# Patient Record
Sex: FEMALE | Race: WHITE | NOT HISPANIC OR LATINO
[De-identification: names, ages, dates, MRNs, and addresses within clinical notes are randomized per-mention and may not be internally consistent; named-entity substitution may affect disease eponyms.]

---

## 2017-01-23 ENCOUNTER — APPOINTMENT (OUTPATIENT)
Dept: SURGERY | Facility: CLINIC | Age: 52
End: 2017-01-23

## 2017-01-23 VITALS
WEIGHT: 241 LBS | DIASTOLIC BLOOD PRESSURE: 68 MMHG | SYSTOLIC BLOOD PRESSURE: 112 MMHG | HEART RATE: 62 BPM | BODY MASS INDEX: 40.15 KG/M2 | HEIGHT: 65 IN

## 2017-01-31 ENCOUNTER — APPOINTMENT (OUTPATIENT)
Dept: RADIOLOGY | Facility: HOSPITAL | Age: 52
End: 2017-01-31

## 2017-01-31 ENCOUNTER — OUTPATIENT (OUTPATIENT)
Dept: OUTPATIENT SERVICES | Facility: HOSPITAL | Age: 52
LOS: 1 days | End: 2017-01-31
Payer: COMMERCIAL

## 2017-01-31 DIAGNOSIS — K21.9 GASTRO-ESOPHAGEAL REFLUX DISEASE WITHOUT ESOPHAGITIS: ICD-10-CM

## 2017-01-31 PROCEDURE — 77002 NEEDLE LOCALIZATION BY XRAY: CPT

## 2017-01-31 PROCEDURE — 43999 UNLISTED PROCEDURE STOMACH: CPT

## 2017-03-09 ENCOUNTER — APPOINTMENT (OUTPATIENT)
Dept: SURGERY | Facility: CLINIC | Age: 52
End: 2017-03-09

## 2017-05-10 ENCOUNTER — APPOINTMENT (OUTPATIENT)
Dept: RADIOLOGY | Facility: HOSPITAL | Age: 52
End: 2017-05-10

## 2017-05-10 ENCOUNTER — OUTPATIENT (OUTPATIENT)
Dept: OUTPATIENT SERVICES | Facility: HOSPITAL | Age: 52
LOS: 1 days | End: 2017-05-10
Payer: COMMERCIAL

## 2017-05-10 DIAGNOSIS — Z46.51 ENCOUNTER FOR FITTING AND ADJUSTMENT OF GASTRIC LAP BAND: ICD-10-CM

## 2017-05-10 PROCEDURE — 43999 UNLISTED PROCEDURE STOMACH: CPT

## 2017-05-10 PROCEDURE — 77002 NEEDLE LOCALIZATION BY XRAY: CPT

## 2017-10-19 ENCOUNTER — TRANSCRIPTION ENCOUNTER (OUTPATIENT)
Age: 52
End: 2017-10-19

## 2017-10-23 ENCOUNTER — APPOINTMENT (OUTPATIENT)
Dept: HEART AND VASCULAR | Facility: CLINIC | Age: 52
End: 2017-10-23
Payer: COMMERCIAL

## 2017-10-23 ENCOUNTER — OUTPATIENT (OUTPATIENT)
Dept: OUTPATIENT SERVICES | Facility: HOSPITAL | Age: 52
LOS: 1 days | End: 2017-10-23
Payer: COMMERCIAL

## 2017-10-23 VITALS
DIASTOLIC BLOOD PRESSURE: 68 MMHG | BODY MASS INDEX: 43.32 KG/M2 | WEIGHT: 260 LBS | HEART RATE: 60 BPM | SYSTOLIC BLOOD PRESSURE: 109 MMHG | HEIGHT: 65 IN

## 2017-10-23 PROCEDURE — 75574 CT ANGIO HRT W/3D IMAGE: CPT | Mod: 26

## 2017-10-23 PROCEDURE — 93282 PRGRMG EVAL IMPLANTABLE DFB: CPT

## 2017-10-23 PROCEDURE — 75574 CT ANGIO HRT W/3D IMAGE: CPT

## 2017-10-23 RX ORDER — PRAVASTATIN SODIUM 40 MG/1
TABLET ORAL
Refills: 0 | Status: ACTIVE | COMMUNITY

## 2017-10-23 RX ORDER — NEBIVOLOL HYDROCHLORIDE 5 MG/1
5 TABLET ORAL
Refills: 0 | Status: ACTIVE | COMMUNITY

## 2017-10-23 RX ORDER — SITAGLIPTIN 100 MG/1
100 TABLET, FILM COATED ORAL
Refills: 0 | Status: ACTIVE | COMMUNITY

## 2018-02-26 ENCOUNTER — APPOINTMENT (OUTPATIENT)
Dept: HEART AND VASCULAR | Facility: CLINIC | Age: 53
End: 2018-02-26

## 2020-06-01 ENCOUNTER — EMERGENCY (EMERGENCY)
Facility: HOSPITAL | Age: 55
LOS: 1 days | Discharge: HOME | End: 2020-06-01
Attending: EMERGENCY MEDICINE
Payer: COMMERCIAL

## 2020-06-01 VITALS
HEART RATE: 69 BPM | DIASTOLIC BLOOD PRESSURE: 73 MMHG | HEIGHT: 65 IN | SYSTOLIC BLOOD PRESSURE: 113 MMHG | OXYGEN SATURATION: 100 % | TEMPERATURE: 98 F | RESPIRATION RATE: 18 BRPM | WEIGHT: 229.94 LBS

## 2020-06-01 VITALS
SYSTOLIC BLOOD PRESSURE: 121 MMHG | OXYGEN SATURATION: 94 % | DIASTOLIC BLOOD PRESSURE: 70 MMHG | HEART RATE: 66 BPM | RESPIRATION RATE: 18 BRPM | TEMPERATURE: 98 F

## 2020-06-01 LAB
ALBUMIN SERPL ELPH-MCNC: 3.5 G/DL — SIGNIFICANT CHANGE UP (ref 3.5–5)
ALP SERPL-CCNC: 108 U/L — SIGNIFICANT CHANGE UP (ref 40–120)
ALT FLD-CCNC: 47 U/L DA — SIGNIFICANT CHANGE UP (ref 10–60)
ANION GAP SERPL CALC-SCNC: 9 MMOL/L — SIGNIFICANT CHANGE UP (ref 5–17)
APTT BLD: 37.5 SEC — HIGH (ref 27.5–36.3)
AST SERPL-CCNC: 27 U/L — SIGNIFICANT CHANGE UP (ref 10–40)
BASOPHILS # BLD AUTO: 0.07 K/UL — SIGNIFICANT CHANGE UP (ref 0–0.2)
BASOPHILS NFR BLD AUTO: 0.8 % — SIGNIFICANT CHANGE UP (ref 0–2)
BILIRUB SERPL-MCNC: 0.2 MG/DL — SIGNIFICANT CHANGE UP (ref 0.2–1.2)
BUN SERPL-MCNC: 16 MG/DL — SIGNIFICANT CHANGE UP (ref 7–18)
CALCIUM SERPL-MCNC: 9.3 MG/DL — SIGNIFICANT CHANGE UP (ref 8.4–10.5)
CHLORIDE SERPL-SCNC: 107 MMOL/L — SIGNIFICANT CHANGE UP (ref 96–108)
CK SERPL-CCNC: 109 U/L — SIGNIFICANT CHANGE UP (ref 21–215)
CK SERPL-CCNC: 119 U/L — SIGNIFICANT CHANGE UP (ref 21–215)
CO2 SERPL-SCNC: 25 MMOL/L — SIGNIFICANT CHANGE UP (ref 22–31)
CREAT SERPL-MCNC: 0.91 MG/DL — SIGNIFICANT CHANGE UP (ref 0.5–1.3)
EOSINOPHIL # BLD AUTO: 0.68 K/UL — HIGH (ref 0–0.5)
EOSINOPHIL NFR BLD AUTO: 7.9 % — HIGH (ref 0–6)
GLUCOSE SERPL-MCNC: 108 MG/DL — HIGH (ref 70–99)
HCT VFR BLD CALC: 48.3 % — HIGH (ref 34.5–45)
HGB BLD-MCNC: 14.9 G/DL — SIGNIFICANT CHANGE UP (ref 11.5–15.5)
IMM GRANULOCYTES NFR BLD AUTO: 0.8 % — SIGNIFICANT CHANGE UP (ref 0–1.5)
INR BLD: 0.91 RATIO — SIGNIFICANT CHANGE UP (ref 0.88–1.16)
LIDOCAIN IGE QN: 224 U/L — SIGNIFICANT CHANGE UP (ref 73–393)
LYMPHOCYTES # BLD AUTO: 2.14 K/UL — SIGNIFICANT CHANGE UP (ref 1–3.3)
LYMPHOCYTES # BLD AUTO: 24.7 % — SIGNIFICANT CHANGE UP (ref 13–44)
MAGNESIUM SERPL-MCNC: 1.9 MG/DL — SIGNIFICANT CHANGE UP (ref 1.6–2.6)
MCHC RBC-ENTMCNC: 26.9 PG — LOW (ref 27–34)
MCHC RBC-ENTMCNC: 30.8 GM/DL — LOW (ref 32–36)
MCV RBC AUTO: 87.3 FL — SIGNIFICANT CHANGE UP (ref 80–100)
MONOCYTES # BLD AUTO: 0.59 K/UL — SIGNIFICANT CHANGE UP (ref 0–0.9)
MONOCYTES NFR BLD AUTO: 6.8 % — SIGNIFICANT CHANGE UP (ref 2–14)
NEUTROPHILS # BLD AUTO: 5.11 K/UL — SIGNIFICANT CHANGE UP (ref 1.8–7.4)
NEUTROPHILS NFR BLD AUTO: 59 % — SIGNIFICANT CHANGE UP (ref 43–77)
NRBC # BLD: 0 /100 WBCS — SIGNIFICANT CHANGE UP (ref 0–0)
PLATELET # BLD AUTO: 285 K/UL — SIGNIFICANT CHANGE UP (ref 150–400)
POTASSIUM SERPL-MCNC: 4 MMOL/L — SIGNIFICANT CHANGE UP (ref 3.5–5.3)
POTASSIUM SERPL-SCNC: 4 MMOL/L — SIGNIFICANT CHANGE UP (ref 3.5–5.3)
PROT SERPL-MCNC: 7.6 G/DL — SIGNIFICANT CHANGE UP (ref 6–8.3)
PROTHROM AB SERPL-ACNC: 10.3 SEC — SIGNIFICANT CHANGE UP (ref 10–12.9)
RBC # BLD: 5.53 M/UL — HIGH (ref 3.8–5.2)
RBC # FLD: 14.2 % — SIGNIFICANT CHANGE UP (ref 10.3–14.5)
SODIUM SERPL-SCNC: 141 MMOL/L — SIGNIFICANT CHANGE UP (ref 135–145)
TROPONIN I SERPL-MCNC: <0.015 NG/ML — SIGNIFICANT CHANGE UP (ref 0–0.04)
TROPONIN I SERPL-MCNC: <0.015 NG/ML — SIGNIFICANT CHANGE UP (ref 0–0.04)
WBC # BLD: 8.66 K/UL — SIGNIFICANT CHANGE UP (ref 3.8–10.5)
WBC # FLD AUTO: 8.66 K/UL — SIGNIFICANT CHANGE UP (ref 3.8–10.5)

## 2020-06-01 PROCEDURE — 85027 COMPLETE CBC AUTOMATED: CPT

## 2020-06-01 PROCEDURE — 36415 COLL VENOUS BLD VENIPUNCTURE: CPT

## 2020-06-01 PROCEDURE — 85610 PROTHROMBIN TIME: CPT

## 2020-06-01 PROCEDURE — 82550 ASSAY OF CK (CPK): CPT

## 2020-06-01 PROCEDURE — 99285 EMERGENCY DEPT VISIT HI MDM: CPT

## 2020-06-01 PROCEDURE — 85730 THROMBOPLASTIN TIME PARTIAL: CPT

## 2020-06-01 PROCEDURE — 71045 X-RAY EXAM CHEST 1 VIEW: CPT | Mod: 26

## 2020-06-01 PROCEDURE — 93005 ELECTROCARDIOGRAM TRACING: CPT

## 2020-06-01 PROCEDURE — 83735 ASSAY OF MAGNESIUM: CPT

## 2020-06-01 PROCEDURE — 80053 COMPREHEN METABOLIC PANEL: CPT

## 2020-06-01 PROCEDURE — 83690 ASSAY OF LIPASE: CPT

## 2020-06-01 PROCEDURE — 99283 EMERGENCY DEPT VISIT LOW MDM: CPT | Mod: 25

## 2020-06-01 PROCEDURE — 84484 ASSAY OF TROPONIN QUANT: CPT

## 2020-06-01 PROCEDURE — 71045 X-RAY EXAM CHEST 1 VIEW: CPT

## 2020-06-01 NOTE — ED PROVIDER NOTE - PATIENT PORTAL LINK FT
You can access the FollowMyHealth Patient Portal offered by [facility] by registering at the following website: [email or web address]. By joining [employer]’s FollowMyHealth portal, you will also be able to view your health information using other applications (apps) compatible with our system.

## 2020-06-01 NOTE — ED PROVIDER NOTE - OBJECTIVE STATEMENT
56 yo female with PMHx of sudden cardiac death, CAD with stent to LAD in 4/2010, HTN, hypothyroidism, defibrillator in place, presents with several days of intermittent substernal chest discomfort that is described as if something is stuck there. Patient also reports right sided chest pain described as pinching that  began today. Patient states she has a cardiology appointment tomorrow but became concerned about the new chest pain today so she came to the ER for evaluation. She denies any shortness of breath, fever, cough, or any covid symptoms. 54 yo female with PMHx of sudden cardiac death 20 years ago now AICD, CAD with stent to LAD in 4/2010, HTN, hypothyroidism, presents with several days of intermittent substernal chest discomfort that is described as if something is stuck there. Patient also reports right sided chest pain described as pinching that  began today. Patient states she has a cardiology appointment tomorrow but became concerned about the new chest pain today so she came to the ER for evaluation. She denies any shortness of breath, fever, cough, or any covid symptoms.

## 2020-06-01 NOTE — ED PROVIDER NOTE - PSH
C Section  1991  Dyslipidemia    History of Cholecystectomy    History of Colonoscopy  In SEPT of 2010 was WNL

## 2020-06-01 NOTE — ED ADULT TRIAGE NOTE - CHIEF COMPLAINT QUOTE
c/o on and off chest pain x 3 days reports stabbing pain to Rt chest this morning w/ some numbness to Left arm

## 2024-01-24 NOTE — ED PROVIDER NOTE - CONDITION AT DISCHARGE:
IT WAS GREAT TO SEE YOU TODAY!    PLEASE WORK ON DIET - EAT MORE LEAN PROTEINS (CHICKEN, FISH, BEANS, TURKEY), FRUITS, VEGETABLES AND DRINK MORE WATER.  EAT LESS RED MEAT, DAIRY PRODUCTS, STARCHY FOODS (POTATOES, RICE, PASTA, BREAD, TORTILLAS, CHIPS, COOKIES, CAKES), SWEETS AND DRINK LESS SODA, LESS ENERGY DRINKS, LESS JUICE AND SWEET TEA.  TRY TO EAT THREE MEALS A DAY WITH SOME SORT OF PROTEIN AND TRY TO CUT BACK ON SNACKS (UNLESS IT IS HEALTHY - VEGGIES AND HUMMUS, ONE SERVING OF NUTS, ONE SERVING OF FRUIT, ETC).  PAY ATTENTION TO SERVING SIZES ON THE PACKAGES SO YOU DO NOT EAT LARGER PORTIONS.    Please try to do some form of aerobic exercise at least 3-4 times per week for about 20-30 minutes at a time.  Aerobic exercise can include walking, hiking, jogging, swimming or using an elliptical machine.  You can also do light weights or consider doing free exercises on your smart TV.  [email or web address] has multiple free exercise videos that include yoga, kickboxing, pilates, aerobics, etc.      PLEASE TAKE ALL MEDICATION AS DISCUSSED.    ~TAKE THE PHENTERMINE IN THE MORNING WITH BREAKFAST TO HELP CURB YOUR APPETITE.  USE THIS TO HELP WORK ON A HEALTHY DIET ROUTINE AND CONTINUE YOUR EXERCISE ROUTINE.  STOP THE MEDICINE AND LET ME KNOW IF IT CAUSES PALPITATIONS, HEADACHES, DIZZINESS OR SHORTNESS OF BREATH.    I WILL SEE YOU AGAIN IN 4 WEEKS/MONTHS BUT PLEASE CALL WITH CONCERNS [phone]     Improved

## 2024-07-30 NOTE — ED ADULT TRIAGE NOTE - RESPIRATORY RATE (BREATHS/MIN)
----- Message from ANU Alston sent at 7/30/2024 12:55 PM CDT -----  Normal sodium and potassium.  Normal blood sugar.  Normal kidney function.  Normal liver function. Normal blood count with no anemia.  Sed rate was just mildly elevated at 23, but CRP was normal so doubt any active inflammatory process.  Lyme screen was negative.  The other tickborne panel is still pending, this one takes a few more days to result.  How she feeling today?   18

## 2024-12-27 ENCOUNTER — INPATIENT (INPATIENT)
Facility: HOSPITAL | Age: 59
LOS: 2 days | Discharge: HOME | DRG: 66 | End: 2024-12-30
Attending: STUDENT IN AN ORGANIZED HEALTH CARE EDUCATION/TRAINING PROGRAM | Admitting: STUDENT IN AN ORGANIZED HEALTH CARE EDUCATION/TRAINING PROGRAM
Payer: COMMERCIAL

## 2024-12-27 VITALS
OXYGEN SATURATION: 98 % | RESPIRATION RATE: 16 BRPM | HEART RATE: 58 BPM | DIASTOLIC BLOOD PRESSURE: 85 MMHG | SYSTOLIC BLOOD PRESSURE: 152 MMHG | WEIGHT: 214.29 LBS | TEMPERATURE: 98 F

## 2024-12-27 DIAGNOSIS — I63.9 CEREBRAL INFARCTION, UNSPECIFIED: ICD-10-CM

## 2024-12-27 LAB
ALBUMIN SERPL ELPH-MCNC: 3.9 G/DL — SIGNIFICANT CHANGE UP (ref 3.5–5)
ALP SERPL-CCNC: 131 U/L — HIGH (ref 40–120)
ALT FLD-CCNC: 42 U/L DA — SIGNIFICANT CHANGE UP (ref 10–60)
ANION GAP SERPL CALC-SCNC: 6 MMOL/L — SIGNIFICANT CHANGE UP (ref 5–17)
APTT BLD: 37.1 SEC — HIGH (ref 24.5–35.6)
AST SERPL-CCNC: 26 U/L — SIGNIFICANT CHANGE UP (ref 10–40)
BASOPHILS # BLD AUTO: 0.08 K/UL — SIGNIFICANT CHANGE UP (ref 0–0.2)
BASOPHILS NFR BLD AUTO: 0.8 % — SIGNIFICANT CHANGE UP (ref 0–2)
BILIRUB SERPL-MCNC: 0.3 MG/DL — SIGNIFICANT CHANGE UP (ref 0.2–1.2)
BUN SERPL-MCNC: 14 MG/DL — SIGNIFICANT CHANGE UP (ref 7–18)
CALCIUM SERPL-MCNC: 9.5 MG/DL — SIGNIFICANT CHANGE UP (ref 8.4–10.5)
CHLORIDE SERPL-SCNC: 109 MMOL/L — HIGH (ref 96–108)
CO2 SERPL-SCNC: 26 MMOL/L — SIGNIFICANT CHANGE UP (ref 22–31)
CREAT SERPL-MCNC: 0.88 MG/DL — SIGNIFICANT CHANGE UP (ref 0.5–1.3)
EGFR: 76 ML/MIN/1.73M2 — SIGNIFICANT CHANGE UP
EOSINOPHIL # BLD AUTO: 0.5 K/UL — SIGNIFICANT CHANGE UP (ref 0–0.5)
EOSINOPHIL NFR BLD AUTO: 5.2 % — SIGNIFICANT CHANGE UP (ref 0–6)
GLUCOSE SERPL-MCNC: 132 MG/DL — HIGH (ref 70–99)
HCT VFR BLD CALC: 45.7 % — HIGH (ref 34.5–45)
HGB BLD-MCNC: 14.8 G/DL — SIGNIFICANT CHANGE UP (ref 11.5–15.5)
IMM GRANULOCYTES NFR BLD AUTO: 0.8 % — SIGNIFICANT CHANGE UP (ref 0–0.9)
INR BLD: 0.96 RATIO — SIGNIFICANT CHANGE UP (ref 0.85–1.16)
LYMPHOCYTES # BLD AUTO: 2.4 K/UL — SIGNIFICANT CHANGE UP (ref 1–3.3)
LYMPHOCYTES # BLD AUTO: 24.8 % — SIGNIFICANT CHANGE UP (ref 13–44)
MCHC RBC-ENTMCNC: 29.2 PG — SIGNIFICANT CHANGE UP (ref 27–34)
MCHC RBC-ENTMCNC: 32.4 G/DL — SIGNIFICANT CHANGE UP (ref 32–36)
MCV RBC AUTO: 90.3 FL — SIGNIFICANT CHANGE UP (ref 80–100)
MONOCYTES # BLD AUTO: 0.67 K/UL — SIGNIFICANT CHANGE UP (ref 0–0.9)
MONOCYTES NFR BLD AUTO: 6.9 % — SIGNIFICANT CHANGE UP (ref 2–14)
NEUTROPHILS # BLD AUTO: 5.96 K/UL — SIGNIFICANT CHANGE UP (ref 1.8–7.4)
NEUTROPHILS NFR BLD AUTO: 61.5 % — SIGNIFICANT CHANGE UP (ref 43–77)
NRBC # BLD: 0 /100 WBCS — SIGNIFICANT CHANGE UP (ref 0–0)
PLATELET # BLD AUTO: 298 K/UL — SIGNIFICANT CHANGE UP (ref 150–400)
POTASSIUM SERPL-MCNC: 3.8 MMOL/L — SIGNIFICANT CHANGE UP (ref 3.5–5.3)
POTASSIUM SERPL-SCNC: 3.8 MMOL/L — SIGNIFICANT CHANGE UP (ref 3.5–5.3)
PROT SERPL-MCNC: 7.7 G/DL — SIGNIFICANT CHANGE UP (ref 6–8.3)
PROTHROM AB SERPL-ACNC: 11.1 SEC — SIGNIFICANT CHANGE UP (ref 9.9–13.4)
RBC # BLD: 5.06 M/UL — SIGNIFICANT CHANGE UP (ref 3.8–5.2)
RBC # FLD: 13.4 % — SIGNIFICANT CHANGE UP (ref 10.3–14.5)
SODIUM SERPL-SCNC: 141 MMOL/L — SIGNIFICANT CHANGE UP (ref 135–145)
TROPONIN I, HIGH SENSITIVITY RESULT: 5.5 NG/L — SIGNIFICANT CHANGE UP
WBC # BLD: 9.69 K/UL — SIGNIFICANT CHANGE UP (ref 3.8–10.5)
WBC # FLD AUTO: 9.69 K/UL — SIGNIFICANT CHANGE UP (ref 3.8–10.5)

## 2024-12-27 PROCEDURE — 0042T: CPT | Mod: MC

## 2024-12-27 PROCEDURE — 70498 CT ANGIOGRAPHY NECK: CPT | Mod: 26,MC

## 2024-12-27 PROCEDURE — 99285 EMERGENCY DEPT VISIT HI MDM: CPT

## 2024-12-27 PROCEDURE — 99222 1ST HOSP IP/OBS MODERATE 55: CPT

## 2024-12-27 PROCEDURE — 70496 CT ANGIOGRAPHY HEAD: CPT | Mod: 26,MC

## 2024-12-27 PROCEDURE — 70450 CT HEAD/BRAIN W/O DYE: CPT | Mod: 26,MC,59

## 2024-12-27 RX ORDER — DONEPEZIL HYDROCHLORIDE 5 MG/1
10 TABLET, FILM COATED ORAL AT BEDTIME
Refills: 0 | Status: DISCONTINUED | OUTPATIENT
Start: 2024-12-28 | End: 2024-12-28

## 2024-12-27 RX ORDER — MEMANTINE HYDROCHLORIDE 14 MG/1
1 CAPSULE, EXTENDED RELEASE ORAL
Refills: 0 | DISCHARGE

## 2024-12-27 RX ORDER — TENECTEPLASE 50 MG
24 KIT INTRAVENOUS ONCE
Refills: 0 | Status: COMPLETED | OUTPATIENT
Start: 2024-12-27 | End: 2024-12-27

## 2024-12-27 RX ORDER — ORAL SEMAGLUTIDE 3 MG/1
0 TABLET ORAL
Refills: 0 | DISCHARGE

## 2024-12-27 RX ORDER — ORAL SEMAGLUTIDE 3 MG/1
1 TABLET ORAL
Refills: 0 | DISCHARGE

## 2024-12-27 RX ORDER — SODIUM CHLORIDE 9 MG/ML
10 INJECTION, SOLUTION INTRAMUSCULAR; INTRAVENOUS; SUBCUTANEOUS ONCE
Refills: 0 | Status: COMPLETED | OUTPATIENT
Start: 2024-12-27 | End: 2024-12-27

## 2024-12-27 RX ORDER — AMITRIPTYLINE HCL 25 MG
10 TABLET ORAL AT BEDTIME
Refills: 0 | Status: DISCONTINUED | OUTPATIENT
Start: 2024-12-28 | End: 2024-12-28

## 2024-12-27 RX ORDER — INSULIN LISPRO 100/ML
VIAL (ML) SUBCUTANEOUS EVERY 6 HOURS
Refills: 0 | Status: DISCONTINUED | OUTPATIENT
Start: 2024-12-27 | End: 2024-12-28

## 2024-12-27 RX ORDER — LEVOTHYROXINE SODIUM 175 UG/1
125 TABLET ORAL DAILY
Refills: 0 | Status: DISCONTINUED | OUTPATIENT
Start: 2024-12-28 | End: 2024-12-28

## 2024-12-27 RX ORDER — ICOSAPENT ETHYL 1 G/1
2 CAPSULE ORAL
Refills: 0 | DISCHARGE

## 2024-12-27 RX ORDER — ATORVASTATIN CALCIUM 40 MG/1
80 TABLET, FILM COATED ORAL AT BEDTIME
Refills: 0 | Status: DISCONTINUED | OUTPATIENT
Start: 2024-12-28 | End: 2024-12-28

## 2024-12-27 RX ORDER — AMITRIPTYLINE HCL 25 MG
1 TABLET ORAL
Refills: 0 | DISCHARGE

## 2024-12-27 RX ORDER — LEVOTHYROXINE SODIUM 175 UG/1
1 TABLET ORAL
Refills: 0 | DISCHARGE

## 2024-12-27 RX ORDER — INFLUENZA A VIRUS A/WISCONSIN/588/2019 (H1N1) RECOMBINANT HEMAGGLUTININ ANTIGEN, INFLUENZA A VIRUS A/DARWIN/6/2021 (H3N2) RECOMBINANT HEMAGGLUTININ ANTIGEN, INFLUENZA B VIRUS B/AUSTRIA/1359417/2021 RECOMBINANT HEMAGGLUTININ ANTIGEN, AND INFLUENZA B VIRUS B/PHUKET/3073/2013 RECOMBINANT HEMAGGLUTININ ANTIGEN 45; 45; 45; 45 UG/.5ML; UG/.5ML; UG/.5ML; UG/.5ML
0.5 INJECTION INTRAMUSCULAR ONCE
Refills: 0 | Status: DISCONTINUED | OUTPATIENT
Start: 2024-12-27 | End: 2024-12-30

## 2024-12-27 RX ORDER — MEMANTINE HYDROCHLORIDE 14 MG/1
5 CAPSULE, EXTENDED RELEASE ORAL
Refills: 0 | Status: DISCONTINUED | OUTPATIENT
Start: 2024-12-28 | End: 2024-12-28

## 2024-12-27 RX ORDER — ATORVASTATIN CALCIUM 40 MG/1
1 TABLET, FILM COATED ORAL
Refills: 0 | DISCHARGE

## 2024-12-27 RX ORDER — DONEPEZIL HYDROCHLORIDE 5 MG/1
0 TABLET, FILM COATED ORAL
Refills: 0 | DISCHARGE

## 2024-12-27 RX ORDER — DONEPEZIL HYDROCHLORIDE 5 MG/1
1 TABLET, FILM COATED ORAL
Refills: 0 | DISCHARGE

## 2024-12-27 RX ORDER — NEBIVOLOL 5 MG/1
1 TABLET ORAL
Refills: 0 | DISCHARGE

## 2024-12-27 RX ORDER — CHLORHEXIDINE GLUCONATE 1.2 MG/ML
1 RINSE ORAL
Refills: 0 | Status: DISCONTINUED | OUTPATIENT
Start: 2024-12-27 | End: 2024-12-30

## 2024-12-27 RX ADMIN — SODIUM CHLORIDE 10 MILLILITER(S): 9 INJECTION, SOLUTION INTRAMUSCULAR; INTRAVENOUS; SUBCUTANEOUS at 13:29

## 2024-12-27 RX ADMIN — SODIUM CHLORIDE 10 MILLILITER(S): 9 INJECTION, SOLUTION INTRAMUSCULAR; INTRAVENOUS; SUBCUTANEOUS at 13:31

## 2024-12-27 RX ADMIN — TENECTEPLASE 3456 MILLIGRAM(S): KIT at 13:30

## 2024-12-27 RX ADMIN — CHLORHEXIDINE GLUCONATE 1 APPLICATION(S): 1.2 RINSE ORAL at 18:05

## 2024-12-27 NOTE — H&P ADULT - NSICDXPASTMEDICALHX_GEN_ALL_CORE_FT
PAST MEDICAL HISTORY:  CAD (Coronary Artery Disease) with stent to LAD in 04/2010    HTN (Hypertension)     Hypothyroidism

## 2024-12-27 NOTE — ED ADULT TRIAGE NOTE - CHIEF COMPLAINT QUOTE
late entry: 1000 -1030 patient tstarted facial twitching, slurred speech, no weaknress, numbness in triage

## 2024-12-27 NOTE — ED PROVIDER NOTE - NEUROLOGICAL, MLM
No Alert and oriented, no focal deficits, no motor or sensory deficits. see nih scale for more details.

## 2024-12-27 NOTE — H&P ADULT - NSHPPHYSICALEXAM_GEN_ALL_CORE
GENERAL: NAD, obese female, laying in bed  HEAD:  Atraumatic, Normocephalic  EYES: EOMI, PERRLA, conjunctiva and sclera clear  NECK: Supple  CHEST/LUNG: Clear to auscultation bilaterally, no RRW  HEART: Regular rate and rhythm; No murmurs, rubs, or gallops  ABDOMEN: Soft, Nontender, Nondistended; Bowel sounds present  EXTREMITIES:  2+ Peripheral Pulses, No edema  PSYCH: AAOx3  NEUROLOGY: 5/5 strength in all extremities, no loss of sensation. NIHSS 3, ataxia in left arm on F2N testing (+1), Aphasia, difficulty with word finding(+2)  SKIN: No rashes or lesions

## 2024-12-27 NOTE — PATIENT PROFILE ADULT - FALL HARM RISK - HARM RISK INTERVENTIONS
Assistance with ambulation/Assistance OOB with selected safe patient handling equipment/Communicate Risk of Fall with Harm to all staff/Discuss with provider need for PT consult/Monitor for mental status changes/Monitor gait and stability/Reinforce activity limits and safety measures with patient and family/Reorient to person, place and time as needed/Review medications for side effects contributing to fall risk/Tailored Fall Risk Interventions/Toileting schedule using arm’s reach rule for commode and bathroom/Use of alarms - bed, chair and/or voice tab/Visual Cue: Yellow wristband and red socks/Bed in lowest position, wheels locked, appropriate side rails in place/Call bell, personal items and telephone in reach/Instruct patient to call for assistance before getting out of bed or chair/Non-slip footwear when patient is out of bed/[place] to call system/Physically safe environment - no spills, clutter or unnecessary equipment/Purposeful Proactive Rounding/Room/bathroom lighting operational, light cord in reach

## 2024-12-27 NOTE — H&P ADULT - ASSESSMENT
Assessment: 58 y/o F with PMHx of HTN, HLD, DM, hypothyroidism, dementia, cardaic arrest s/p ICD, current smoker who presents with aphasia. Code stroke in ED. NIHSS 2, for aphasia. Tele stroke consulted, pt s/p TNK 1:30PM 12/27. Adm to ICU for post TNK management.      Plan:  #CVA s/p TNK  #H/o of HTN, HLD, DM, Hypothyroidism, dementia, cardiac arrest    Neuro:  #AOX3, currently at baseline (has some difficulty identifying year)    #CVA  #Aphasia  Pt presents with aphasia, NIHSS 2 in ED  CT stroke protocol negative on admission  on my assessment NIHSS 3, left hand ataxia and aphasia  - C/w atorvastatin 80qhs  - C/w Neuro checks  every 15 minutes for two hours, then every 30 minutes for six hours, then every 60 minutes until 24 hours from the start of thrombolysis  - C/w TELE monitoring  - F/u Echo w/bubble  - F/u A1c, Lipid Panel  - repeat CTH 1:30PM on 12/28  Neuro Dr. Fregoso Consulted     Cardiovascular:  #HTN  - not on meds currently     #HLD  Pt has a history of HLD, takes atorvastatin 40mg qhs at home  - C/w atorvastatin    Pulmonary:   #no active issues    Infectious Diseases:  #No active issues    Gastrointestinal:  #NPO for 12 hours post TNK, can resume feeding in AM with breakfast    Renal:  #No active issues    Heme/onc:   #No active issues    Endo:   #DM  Pt has a history of DM, takes ozempic at home  A1c 6.4 from April 2024  - C/w ISS q6 while NPO  - FS q6h    Skin/ catheter:   #Peripheral Lines    Prophylaxis:   #HOLD CHEMICAL DVT PPX until after repeat CTH   - SCDs    Goals of Care: FULL CODE    Dispo: ICU Assessment: 60 y/o F with PMHx of HTN, HLD, DM, hypothyroidism, dementia, cardaic arrest s/p ICD, current smoker who presents with aphasia. Code stroke in ED. NIHSS 2, for aphasia. Tele stroke consulted, pt s/p TNK 1:30PM 12/27. Adm to ICU for post TNK management.      Plan:  #Aphasia - concern for CVA s/p TNK  #HTN  #HLD  #DM,  #Hypothyroidism  H/o dementia, cardiac arrest    Neuro:  #AOX3, currently at baseline (has some difficulty identifying year)      #Aphasia- concern for CVA  Pt presents with aphasia, NIHSS 2 in ED  CT stroke protocol negative on admission  on my assessment NIHSS 3, left hand ataxia and aphasia  - C/w atorvastatin 80qhs  - C/w Neuro checks  every 15 minutes for two hours, then every 30 minutes for six hours, then every 60 minutes until 24 hours from the start of thrombolysis  - C/w TELE monitoring  - F/u Echo w/bubble  - F/u A1c, Lipid Panel  - repeat CTH 1:30PM on 12/28  Neuro Dr. Fregoso Consulted     Cardiovascular:  #HTN  - not on meds currently     #HLD  Pt has a history of HLD, takes atorvastatin 40mg qhs at home  - C/w atorvastatin    Pulmonary:   #no active issues    Infectious Diseases:  #No active issues    Gastrointestinal:  #NPO for 12 hours post TNK, can resume feeding in AM with breakfast    Renal:  #No active issues    Heme/onc:   #No active issues    Endo:   #DM  Pt has a history of DM, takes ozempic at home  A1c 6.4 from April 2024  - C/w ISS q6 while NPO  - FS q6h    Skin/ catheter:   #Peripheral Lines    Prophylaxis:   #HOLD CHEMICAL DVT PPX until after repeat CTH   - SCDs    Goals of Care: FULL CODE    Dispo: ICU Assessment: 58 y/o F with PMHx of HTN, HLD, DM, hypothyroidism, dementia, cardiac arrest s/p ICD, current smoker who presents with aphasia. Code stroke in ED. NIHSS 2, for aphasia. Tele stroke consulted, pt s/p TNK 1:30PM 12/27. Adm to ICU for post TNK management.      Plan:  #Aphasia - concern for CVA s/p TNK  #HTN  #HLD  #DM,  #Hypothyroidism  H/o dementia, cardiac arrest    Neuro:  #AOX3, currently at baseline (has some difficulty identifying year)      #Aphasia- concern for CVA  Pt presents with aphasia, NIHSS 2 in ED  CT stroke protocol negative on admission  on my assessment NIHSS 3, left hand ataxia and aphasia  - C/w atorvastatin 80qhs  - C/w Neuro checks  every 15 minutes for two hours, then every 30 minutes for six hours, then every 60 minutes until 24 hours from the start of thrombolysis  - C/w TELE monitoring  - F/u Echo w/bubble  - F/u A1c, Lipid Panel  - repeat CTH 1:30PM on 12/28  Neuro Dr. Fregoso Consulted     Cardiovascular:  #HTN  - not on meds currently     #HLD  Pt has a history of HLD, takes atorvastatin 40mg qhs at home  - C/w atorvastatin    #h/o cardiac arrest  pt has ICD in place    Pulmonary:   #no active issues    Infectious Diseases:  #No active issues    Gastrointestinal:  #NPO for 12 hours post TNK, can resume feeding in AM with breakfast    Renal:  #No active issues    Heme/onc:   #No active issues    Endo:   #DM  Pt has a history of DM, takes ozempic at home  A1c 6.4 from April 2024  - C/w ISS q6 while NPO  - FS q6h    #Hypothyroidism  #Multinodular Goiter  Outpatient FNA done for multinodular goiter back in July 2024, was benign  Pt has a history of hypothyroidism, takes Levothyroxine 125mcg daily at home  - C/w Levothyroxine 125mcg daily  - F/u TSH    Skin/ catheter:   #Peripheral Lines    Prophylaxis:   #HOLD CHEMICAL DVT PPX until after repeat CTH   - SCDs    Goals of Care: FULL CODE    Dispo: ICU Assessment: 58 y/o F with PMHx of HTN, HLD, DM, hypothyroidism, dementia, cardiac arrest s/p ICD, current smoker who presents with aphasia. Code stroke in ED. NIHSS 2, for aphasia. Tele stroke consulted, pt s/p TNK 1:30PM 12/27. Adm to ICU for post TNK management.      Plan:  #Aphasia - concern for CVA s/p TNK  #HTN  #HLD  #DM,  #Hypothyroidism  H/o dementia, cardiac arrest    Neuro:  #AOX3, currently at baseline (has some difficulty identifying year)      #Aphasia- concern for CVA  Pt presents with aphasia, NIHSS 2 in ED  CT stroke protocol negative on admission  on my assessment NIHSS 3, left hand ataxia and aphasia  - C/w atorvastatin 80qhs  - Dysphagia screen   - C/w Neuro checks  every 15 minutes for two hours, then every 30 minutes for six hours, then every 60 minutes until 24 hours from the start of thrombolysis  - C/w TELE monitoring  - F/u Echo w/bubble  - F/u A1c, Lipid Panel  - repeat CTH 1:30PM on 12/28  Neuro Dr. Fregoso Consulted     Cardiovascular:  #HTN  - not on meds currently     #HLD  Pt has a history of HLD, takes atorvastatin 40mg qhs at home  - C/w atorvastatin    #h/o cardiac arrest  pt has ICD in place    Pulmonary:   #no active issues    Infectious Diseases:  #No active issues    Gastrointestinal:  #NPO for 12 hours post TNK, can resume feeding in AM with breakfast    Renal:  #No active issues    Heme/onc:   #No active issues    Endo:   #DM  Pt has a history of DM, takes ozempic at home  A1c 6.4 from April 2024  - C/w ISS q6 while NPO  - FS q6h    #Hypothyroidism  #Multinodular Goiter  Outpatient FNA done for multinodular goiter back in July 2024, was benign  Pt has a history of hypothyroidism, takes Levothyroxine 125mcg daily at home  - C/w Levothyroxine 125mcg daily  - F/u TSH    Skin/ catheter:   #Peripheral Lines    Prophylaxis:   #HOLD CHEMICAL DVT PPX until after repeat CTH   - SCDs    Goals of Care: FULL CODE    Dispo: ICU

## 2024-12-27 NOTE — PHARMACOTHERAPY INTERVENTION NOTE - COMMENTS
Outpatient medication review was updated based on prescription bottles brought to hospital with patient. Details for Ozempic and donepezil confirmed with pharmacy ([facility] [phone]). Previously taking empagliflozin/metformin but her physician recently stopped it as it was no longer required.
Given to patient and family: [id number] IV Thrombolytic for Adult Ischemic Acute Stroke Patients, Page 15

## 2024-12-27 NOTE — ED PROVIDER NOTE - OBJECTIVE STATEMENT
59-year-old female history of mild cognitive decline and mild aphasia, almost imperceptible as per family, presents the ER with an acute change in her ability to speak at 10 AM while she was at work as an .    It is associated with right jaw twitching.  It is unclear if the patient has history of twitching.   The son says he has never noticed twitching before and he lives with his mom.  The patient says she has had twitching of her face in the past but she cannot elaborate on her further given the degree of her expressive aphasia She is right-hand dominant.No headache or vomiting.  No slurred speech.  No arm or leg weakness.  She is not on aspirin Plavix or any blood thinners.  She is not on any antiplatelet agents.  She has no history of stroke.  Son and girlfriend at bedside says that she does have a neurologist that she saw months ago and they are in the process of working up with suspected frontotemporal dementia however this is not officially diagnosed yet.  They report that she has mild cognitive decline or delay. 59-year-old female history of mild cognitive decline and mild aphasia, almost imperceptible as per family, presents the ER with an acute change in her ability to speak at 10 AM while she was at work as an .    It is associated with right jaw twitching and headache.  It is unclear if the patient has history of twitching.   The son says he has never noticed twitching before and he lives with his mom.  The patient says she has had twitching of her face in the past but she cannot elaborate on her further given the degree of her expressive aphasia She is right-hand dominant. No headache or vomiting.  No slurred speech.  No arm or leg weakness.  She is not on aspirin Plavix or any blood thinners.  She is not on any antiplatelet agents.  She has no history of stroke.  Son and girlfriend at bedside says that she does have a neurologist that she saw months ago and they are in the process of working up with suspected frontotemporal dementia however this is not officially diagnosed yet.  They report that she has mild cognitive decline or delay. They report her aphasia today is markedly worse then her usual.

## 2024-12-27 NOTE — ED ADULT NURSE NOTE - OBJECTIVE STATEMENT
Patient presents to ED c/o aphasia x today this morning. Last well known approx 10am as per patient and patient's son at bedside. Code stroke initiated. Cardiac monitoring placed immediately upon arrival. No limb deficits or facial droop noted. Pt denies any chest pain, sob, dizziness or fever/chills.

## 2024-12-27 NOTE — H&P ADULT - HISTORY OF PRESENT ILLNESS
This is a 60 y/o F, from home, ambulates independently, with PMHx of HTN, HLD, DM, hypothyroidism, dementia, cardaic arrest s/p ICD, current smoker who presents with aphasia. Pt states around 10AM she had a severe headahce and slurred speech that lasted around 2 hours. Pt also noticed she was having trouble finding her words which still has not resolved. Pt denies any recent travel, recent illness, CP, SOB, fever, chills, N/V/D, constipation, weakness, numbness or tingling.  This is a 60 y/o F, from home, ambulates independently, with PMHx of HTN, HLD, DM, hypothyroidism, dementia, cardiac arrest s/p ICD, current smoker who presents with aphasia. Pt states around 10AM she had a severe headahce and slurred speech that lasted around 2 hours. Pt also noticed she was having trouble finding her words which still has not resolved. Pt denies any recent travel, recent illness, CP, SOB, fever, chills, N/V/D, constipation, weakness, numbness or tingling.

## 2024-12-27 NOTE — ED ADULT TRIAGE NOTE - CCCP TRG CHIEF CMPLNT
----- Message from Cara Urrutia sent at 9/21/2020 11:34 AM CDT -----  Vm @ 11:31 pt will be flying out in the morning said she is having some BP and sinus issues. Would like a call to discuss.  # [phone]     facial twitching/weakness

## 2024-12-27 NOTE — H&P ADULT - NSICDXPASTSURGICALHX_GEN_ALL_CORE_FT
PAST SURGICAL HISTORY:  C Section 1991    Dyslipidemia     History of Cholecystectomy     History of Colonoscopy In SEPT of 2010 was WNL

## 2024-12-27 NOTE — CONSULT NOTE ADULT - SUBJECTIVE AND OBJECTIVE BOX
NEUROLOGY CONSULT NOTE    NAME:  MEME MURPHY      ASSESSMENT:  59 RHF with acute onset of expressive and receptive aphasia, concerning for acute ischemic stroke vs. sequelae of dementia, s/p IV TNK administration      RECOMMENDATIONS:    1. Stroke workup  - Repeat CT Head at least 24 hours after IV TNK administration to evaluate for intracranial hemorrhage       - This may be done earlier if the patient has a decline in neurological examination  - MRI Brain approved to evaluate for acute intracranial abnormalities (if there are no contraindications)  - Transthoracic Echocardiogram  - Telemetry monitoring while inpatient  - Hemoglobin A1c  - Fasting lipid panel    2. Secondary stroke prevention  - Q1H Neurochecks & Vital signs as per post-TNK protocol  - Patient has passed a bedside swallow evaluation  - No antiplatelets or anticoagulation within 24 hours after IV TNK administration  - Atorvastatin 80mg PO QHS (may adjust dose to achieve goal LDL < 70 mg/dL)  - Treat BP if over 180/110 within first 24 hours of last seen normal; thereafter treat if over 140/90 (goal /80)  - PT/OT  - DVT ppx: SCDs    3. Unspecified Dementia  - Patient may continue home Donepezil 10mg PO Daily and Memantine 5mg PO BID to help slow down cognitive decline          NOTE TO BE COMPLETED - PLEASE REFER TO ABOVE ONLY AND IGNORE INFORMATION BELOW    *******************************      CHIEF COMPLAINT:  Patient is a 59y old  Female who presents with a chief complaint of concern for CVA s/p TNK (27 Dec 2024 15:19)      HPI:  This is a 58 y/o F, from home, ambulates independently, with PMHx of HTN, HLD, DM, hypothyroidism, dementia, cardiac arrest s/p ICD, current smoker who presents with aphasia. Pt states around 10AM she had a severe headahce and slurred speech that lasted around 2 hours. Pt also noticed she was having trouble finding her words which still has not resolved. Pt denies any recent travel, recent illness, CP, SOB, fever, chills, N/V/D, constipation, weakness, numbness or tingling.  (27 Dec 2024 15:19)      NEURO HPI:      PAST MEDICAL & SURGICAL HISTORY:  HTN (Hypertension)  Hypothyroidism  CAD (Coronary Artery Disease) with stent to LAD in 04/2010  History of Cholecystectomy  C Section, 1991  Dyslipidemia  History of Colonoscopy in Sept. 2010 (WNL)      MEDICATIONS:  amitriptyline 10 milliGRAM(s) Oral at bedtime  atorvastatin 80 milliGRAM(s) Oral at bedtime  chlorhexidine 2% Cloths 1 Application(s) Topical <User Schedule>  donepezil 10 milliGRAM(s) Oral at bedtime  influenza   Vaccine 0.5 milliLiter(s) IntraMuscular once  insulin lispro (ADMELOG) corrective regimen sliding scale   SubCutaneous every 6 hours  levothyroxine 125 MICROGram(s) Oral daily  memantine 5 milliGRAM(s) Oral two times a day      ALLERGIES:  No Known Allergies      FAMILY HISTORY:        SOCIAL HISTORY:  Denies alcohol, tobacco, or illicit drug use      REVIEW OF SYSTEMS:  GENERAL: No fever, weight changes, fatigue  EYES: No eye pain or discharge  EAR/NOSE/MOUTH/THROAT: No sinus or throat pain; No difficulty hearing  NECK: No pain or stiffness  RESPIRATORY: No cough, wheezing, chills, or hemoptysis  CARDIOVASCULAR: No chest pain, palpitations, shortness of breath, or dyspnea on exertion  GASTROINTESTINAL: No abdominal pain, nausea, vomiting, hematemesis, diarrhea, or constipation  GENITOURINARY: No dysuria, frequency, hematuria, or incontinence  SKIN: No rashes or lesions  ENDOCRINE: No heat or cold intolerance  HEMATOLOGIC: No easy bruising or bleeding  PSYCHIATRIC: No depression, anxiety, or mood swings  MUSCULOSKELETAL: No joint pain or swelling  NEUROLOGICAL: As per HPI          OBJECTIVE:    Vital Signs Last 24 Hrs  T(C): 36.1 (27 Dec 2024 16:45), Max: 36.8 (27 Dec 2024 12:48)  T(F): 97 (27 Dec 2024 16:45), Max: 98.3 (27 Dec 2024 12:48)  HR: 59 (27 Dec 2024 23:30) (51 - 84)  BP: 149/67 (27 Dec 2024 23:15) (94/54 - 153/67)  BP(mean): 89 (27 Dec 2024 23:15) (64 - 97)  RR: 13 (27 Dec 2024 23:30) (10 - 28)  SpO2: 93% (27 Dec 2024 23:30) (87% - 100%)  Parameters below as of 27 Dec 2024 18:00  Patient On (Oxygen Delivery Method): room air      General Examination:  General: No acute distress  HEENT: Atraumatic, Normocephalic  Respiratory: CTA B/l.  No crackles, rhonchi, or wheezes.  Cardiovascular: RRR.  Normal S1 & S2.  Normal b/l radial and pedal pulses.    Neurological Examination:  General / Mental Status: AAO x 3.  No aphasia or dysarthria.  Naming and repetition intact.  Cranial Nerves: VFF x 4.  PERRL.  EOMI x 2, No nystagmus or diplopia.  B/l V1-V3 equal and intact to light touch and pinprick.  Symmetric facial movement and palate elevation.  B/l hearing equal to finger rub.  5/5 strength with b/l sternocleidomastoid and trapezius.  Midline tongue protrusion, with no atrophy or fasciculations.  Motor: Normal bulk & tone in all four extremities.  5/5 strength throughout all four extremities.  No downward drift, rigidity, spasticity, or tremors in any of the four extremities.  Sensory: Intact to light touch and pinprick in all four extremities.  Negative Romberg.  Reflex: 2+ and symmetric at b/l biceps, triceps, brachioradialis, patellae, and ankles.  Downgoing toes b/l.  Coordination: No dysmetria with b/l finger-to-nose and heel raise tests.  Symmetric rapid alternating movements b/l.  Gait: Normal, narrow-based gait.  No difficulty with tiptoe, heel, and tandem gaits.          LABORATORY VALUES:                        14.8   9.69  )-----------( 298      ( 27 Dec 2024 13:00 )             45.7       12-27    141  |  109[H]  |  14  ----------------------------<  132[H]  3.8   |  26  |  0.88    Ca    9.5      27 Dec 2024 13:00    TPro  7.7  /  Alb  3.9  /  TBili  0.3  /  DBili  x   /  AST  26  /  ALT  42  /  AlkPhos  131[H]  12-27          NEUROIMAGING:          Please contact the Neurology consult service with any neurological questions.    Brian Fregoso MD   of Neurology  [facility]

## 2024-12-27 NOTE — CONSULT NOTE ADULT - PROVIDER SPECIALTY LIST ADULT
Neurology Nausea and vomiting that does not stop/Inability to tolerate liquids or foods/Increased irritability or sluggishness

## 2024-12-27 NOTE — ED PROVIDER NOTE - PROGRESS NOTE DETAILS
TNK given at 1:08 PM   For disabling neurological symptoms, aphasia, within the window.  Decision made in consultation with stroke neurology Dr. Jones via [facility].  An extensive discussion  Around risk versus benefits was had with patient and the family at bedside regarding his medication. TNK given at 1:08 PM   for disabling neurological symptoms, aphasia, moderate to severe, within the window.  Decision made in consultation with stroke neurology Dr. Jones via [facility].  An extensive discussion  Around risk versus benefits was had with patient and the family at bedside regarding his medication.

## 2024-12-27 NOTE — STROKE CODE NOTE - ASSESSMENT/PLAN
59-year-old right-handed lady evaluated by interprofessional audio consultation at [facility] on 12/27/2024 with aphasia.  NIHSS = 2 per Dr. Mack: Moderate-severe aphasia with word finding difficulty and hesitancy, moderate-severely disfluent speech; moderate-severe anomia.  CT head (12/27/2024) to my eye was unremarkable.  CTA (12/27/2024) pending  CTP (12/27/2024) pending    Impression.  She apparently has a history of very mild cognitive impairment and has been worked up by a neurologist (Dr. Alfa La) for possible frontotemporal dementia with mild baseline word finding difficulty.    Today she was last known well at about 10 AM, when she had the sudden onset of marked word finding difficulty, and on exam appears to have a moderate motor aphasia.  Her presentation is consistent with left hemispheric dysfunction, likely acute ischemic stroke.  While it is possible that her current aphasia represents some type of exacerbation of a neurodegenerative process, the apparent sudden and marked worsening still supports the possibility of acute ischemic stroke.  If this turns out to be a stroke mimic, then the risk of ICH is extremely small so benefits of thrombolysis still outweigh risks.  Dr. Mack noted some "twitches" of her left mandible; I doubt that these represent focal seizures and in any case would localize to the right hemisphere and therefore most likely, even if this were a seizure, would not be confounding the focal neurologic deficit.  No contraindications to IV thrombolysis as discussed with Dr. Mack. Pending results of CTA, most likely not an endovascular thrombectomy candidate because deficits are too mild.  Suggest.  IV tenecteplase ASAP; follow-up CTA and if there is any large vessel occlusion recontact stroke service ASAP; further management as per [facility] team.  Discussed with Dr. Mack.

## 2024-12-27 NOTE — CONSULT NOTE ADULT - REASON FOR ADMISSION
RAFI ROBERTS GIVEN PO AT THIS TIME FOR COMPLAINT OF "SEVERE" LOWER BACK PAIN.
PATIENT REFUSED TO RATE PAIN AND STATED "I'VE BEEN TELLING YOU SINCE I GOT
HERE EARLIER THAT MY BACK HURTS AND YOU OR ANYONE ELSE HAS NOT DONE ANYTHING
ABOUT IT." REORIENTED PATIENT TO PLACE AND TIME, ALSO ADVISED PATIENT THAT HE
JUST RECENTLY ARRIVED TO FLOOR AND THAT THIS RN HAD JUST RECENTLY RECEIVED
ORDERS AND MEDICATIONS FOR HIM. PATIENT REMAINS BELLIGERENT, ARGUMENTATIVE,
AND VERBALLY AGGRESSIVE. CALL LIGHT WITHIN REACH, WILL CONTINUE TO MONITOR. Concern for CVA s/p TNK

## 2024-12-27 NOTE — ED PROVIDER NOTE - CLINICAL SUMMARY MEDICAL DECISION MAKING FREE TEXT BOX
patient comes in with severe aphasia.  She is within the window for TNK.  Her symptoms are disabling.  CT head negative for bleed.  She is on blood thinners.  Extensive discussion had with family regarding treatment.  TNK given.  Admit to ICU.      Twelve-lead normal sinus rhythm no A-fib. Patient comes in with severe aphasia.  She is within the window for TNK.  Her symptoms are disabling.  CT head negative for bleed.  She is on blood thinners.  Extensive discussion had with family regarding treatment.  TNK given.  Admit to ICU.    Twelve-lead normal sinus rhythm, no A-fib.

## 2024-12-27 NOTE — H&P ADULT - ATTENDING COMMENTS
Patient seen at bedside in the ED. Concern for difficulty finding words (Unable to identify water bottle, socks, flashlight). Concern for possible ischemic stroke, NIHSS of 2. TNK given in the ED.     Monitor in the ICU per post thrombolytic protocol  Hold Aspirin for now   Repeat CT scan in 24 hours or if acute change in mentation   Monitor for signs of bleeding  BP control to maintain SBP < 180 and DBP < 105   dysphagia screening   Start statin  Check Hba1c, Echo, Lipid panel  Neurology consult

## 2024-12-28 LAB
A1C WITH ESTIMATED AVERAGE GLUCOSE RESULT: 6 % — HIGH (ref 4–5.6)
ALBUMIN SERPL ELPH-MCNC: 2.8 G/DL — LOW (ref 3.5–5)
ALP SERPL-CCNC: 99 U/L — SIGNIFICANT CHANGE UP (ref 40–120)
ALT FLD-CCNC: 34 U/L DA — SIGNIFICANT CHANGE UP (ref 10–60)
ANION GAP SERPL CALC-SCNC: 3 MMOL/L — LOW (ref 5–17)
AST SERPL-CCNC: 26 U/L — SIGNIFICANT CHANGE UP (ref 10–40)
BASOPHILS # BLD AUTO: 0.06 K/UL — SIGNIFICANT CHANGE UP (ref 0–0.2)
BASOPHILS NFR BLD AUTO: 0.8 % — SIGNIFICANT CHANGE UP (ref 0–2)
BILIRUB SERPL-MCNC: 0.4 MG/DL — SIGNIFICANT CHANGE UP (ref 0.2–1.2)
BUN SERPL-MCNC: 9 MG/DL — SIGNIFICANT CHANGE UP (ref 7–18)
CALCIUM SERPL-MCNC: 8.5 MG/DL — SIGNIFICANT CHANGE UP (ref 8.4–10.5)
CHLORIDE SERPL-SCNC: 112 MMOL/L — HIGH (ref 96–108)
CHOLEST SERPL-MCNC: 161 MG/DL — SIGNIFICANT CHANGE UP
CO2 SERPL-SCNC: 25 MMOL/L — SIGNIFICANT CHANGE UP (ref 22–31)
CREAT SERPL-MCNC: 0.59 MG/DL — SIGNIFICANT CHANGE UP (ref 0.5–1.3)
EGFR: 104 ML/MIN/1.73M2 — SIGNIFICANT CHANGE UP
EOSINOPHIL # BLD AUTO: 0.53 K/UL — HIGH (ref 0–0.5)
EOSINOPHIL NFR BLD AUTO: 6.8 % — HIGH (ref 0–6)
ESTIMATED AVERAGE GLUCOSE: 126 MG/DL — HIGH (ref 68–114)
GLUCOSE BLDC GLUCOMTR-MCNC: 119 MG/DL — HIGH (ref 70–99)
GLUCOSE BLDC GLUCOMTR-MCNC: 120 MG/DL — HIGH (ref 70–99)
GLUCOSE BLDC GLUCOMTR-MCNC: 179 MG/DL — HIGH (ref 70–99)
GLUCOSE SERPL-MCNC: 97 MG/DL — SIGNIFICANT CHANGE UP (ref 70–99)
HCT VFR BLD CALC: 40.8 % — SIGNIFICANT CHANGE UP (ref 34.5–45)
HDLC SERPL-MCNC: 44 MG/DL — LOW
HGB BLD-MCNC: 13.2 G/DL — SIGNIFICANT CHANGE UP (ref 11.5–15.5)
IMM GRANULOCYTES NFR BLD AUTO: 0.6 % — SIGNIFICANT CHANGE UP (ref 0–0.9)
LIPID PNL WITH DIRECT LDL SERPL: 81 MG/DL — SIGNIFICANT CHANGE UP
LYMPHOCYTES # BLD AUTO: 2.51 K/UL — SIGNIFICANT CHANGE UP (ref 1–3.3)
LYMPHOCYTES # BLD AUTO: 32.2 % — SIGNIFICANT CHANGE UP (ref 13–44)
MAGNESIUM SERPL-MCNC: 1.9 MG/DL — SIGNIFICANT CHANGE UP (ref 1.6–2.6)
MCHC RBC-ENTMCNC: 28.6 PG — SIGNIFICANT CHANGE UP (ref 27–34)
MCHC RBC-ENTMCNC: 32.4 G/DL — SIGNIFICANT CHANGE UP (ref 32–36)
MCV RBC AUTO: 88.3 FL — SIGNIFICANT CHANGE UP (ref 80–100)
MONOCYTES # BLD AUTO: 0.57 K/UL — SIGNIFICANT CHANGE UP (ref 0–0.9)
MONOCYTES NFR BLD AUTO: 7.3 % — SIGNIFICANT CHANGE UP (ref 2–14)
MRSA PCR RESULT.: SIGNIFICANT CHANGE UP
NEUTROPHILS # BLD AUTO: 4.08 K/UL — SIGNIFICANT CHANGE UP (ref 1.8–7.4)
NEUTROPHILS NFR BLD AUTO: 52.3 % — SIGNIFICANT CHANGE UP (ref 43–77)
NON HDL CHOLESTEROL: 117 MG/DL — SIGNIFICANT CHANGE UP
NRBC # BLD: 0 /100 WBCS — SIGNIFICANT CHANGE UP (ref 0–0)
PHOSPHATE SERPL-MCNC: 3.1 MG/DL — SIGNIFICANT CHANGE UP (ref 2.5–4.5)
PLATELET # BLD AUTO: 258 K/UL — SIGNIFICANT CHANGE UP (ref 150–400)
POTASSIUM SERPL-MCNC: 3.4 MMOL/L — LOW (ref 3.5–5.3)
POTASSIUM SERPL-SCNC: 3.4 MMOL/L — LOW (ref 3.5–5.3)
PROT SERPL-MCNC: 6.1 G/DL — SIGNIFICANT CHANGE UP (ref 6–8.3)
RBC # BLD: 4.62 M/UL — SIGNIFICANT CHANGE UP (ref 3.8–5.2)
RBC # FLD: 13.5 % — SIGNIFICANT CHANGE UP (ref 10.3–14.5)
S AUREUS DNA NOSE QL NAA+PROBE: SIGNIFICANT CHANGE UP
SODIUM SERPL-SCNC: 140 MMOL/L — SIGNIFICANT CHANGE UP (ref 135–145)
TRIGL SERPL-MCNC: 213 MG/DL — HIGH
TSH SERPL-MCNC: 0.32 UU/ML — LOW (ref 0.34–4.82)
WBC # BLD: 7.8 K/UL — SIGNIFICANT CHANGE UP (ref 3.8–10.5)
WBC # FLD AUTO: 7.8 K/UL — SIGNIFICANT CHANGE UP (ref 3.8–10.5)

## 2024-12-28 PROCEDURE — 70450 CT HEAD/BRAIN W/O DYE: CPT | Mod: 26

## 2024-12-28 RX ORDER — POTASSIUM CHLORIDE 600 MG/1
10 TABLET, FILM COATED, EXTENDED RELEASE ORAL
Refills: 0 | Status: DISCONTINUED | OUTPATIENT
Start: 2024-12-28 | End: 2024-12-28

## 2024-12-28 RX ORDER — POTASSIUM CHLORIDE 600 MG/1
40 TABLET, FILM COATED, EXTENDED RELEASE ORAL ONCE
Refills: 0 | Status: COMPLETED | OUTPATIENT
Start: 2024-12-28 | End: 2024-12-28

## 2024-12-28 RX ORDER — CLOPIDOGREL BISULFATE 75 MG/1
75 TABLET, FILM COATED ORAL DAILY
Refills: 0 | Status: DISCONTINUED | OUTPATIENT
Start: 2024-12-28 | End: 2024-12-30

## 2024-12-28 RX ORDER — ATORVASTATIN CALCIUM 40 MG/1
80 TABLET, FILM COATED ORAL AT BEDTIME
Refills: 0 | Status: DISCONTINUED | OUTPATIENT
Start: 2024-12-28 | End: 2024-12-30

## 2024-12-28 RX ORDER — LEVOTHYROXINE SODIUM 175 UG/1
90 TABLET ORAL AT BEDTIME
Refills: 0 | Status: DISCONTINUED | OUTPATIENT
Start: 2024-12-28 | End: 2024-12-28

## 2024-12-28 RX ORDER — MEMANTINE HYDROCHLORIDE 14 MG/1
5 CAPSULE, EXTENDED RELEASE ORAL
Refills: 0 | Status: DISCONTINUED | OUTPATIENT
Start: 2024-12-28 | End: 2024-12-30

## 2024-12-28 RX ORDER — LEVOTHYROXINE SODIUM 175 UG/1
125 TABLET ORAL DAILY
Refills: 0 | Status: DISCONTINUED | OUTPATIENT
Start: 2024-12-28 | End: 2024-12-30

## 2024-12-28 RX ORDER — INSULIN LISPRO 100/ML
VIAL (ML) SUBCUTANEOUS
Refills: 0 | Status: DISCONTINUED | OUTPATIENT
Start: 2024-12-28 | End: 2024-12-30

## 2024-12-28 RX ORDER — ENOXAPARIN SODIUM 60 MG/.6ML
40 INJECTION INTRAVENOUS; SUBCUTANEOUS EVERY 24 HOURS
Refills: 0 | Status: DISCONTINUED | OUTPATIENT
Start: 2024-12-28 | End: 2024-12-30

## 2024-12-28 RX ORDER — DONEPEZIL HYDROCHLORIDE 5 MG/1
10 TABLET, FILM COATED ORAL AT BEDTIME
Refills: 0 | Status: DISCONTINUED | OUTPATIENT
Start: 2024-12-28 | End: 2024-12-30

## 2024-12-28 RX ORDER — ASPIRIN 81 MG
81 TABLET, DELAYED RELEASE (ENTERIC COATED) ORAL DAILY
Refills: 0 | Status: DISCONTINUED | OUTPATIENT
Start: 2024-12-28 | End: 2024-12-30

## 2024-12-28 RX ADMIN — Medication 81 MILLIGRAM(S): at 16:08

## 2024-12-28 RX ADMIN — CLOPIDOGREL BISULFATE 75 MILLIGRAM(S): 75 TABLET, FILM COATED ORAL at 16:08

## 2024-12-28 RX ADMIN — ATORVASTATIN CALCIUM 80 MILLIGRAM(S): 40 TABLET, FILM COATED ORAL at 21:45

## 2024-12-28 RX ADMIN — MEMANTINE HYDROCHLORIDE 5 MILLIGRAM(S): 14 CAPSULE, EXTENDED RELEASE ORAL at 19:49

## 2024-12-28 RX ADMIN — Medication 1: at 16:19

## 2024-12-28 RX ADMIN — LEVOTHYROXINE SODIUM 90 MICROGRAM(S): 175 TABLET ORAL at 05:19

## 2024-12-28 RX ADMIN — ENOXAPARIN SODIUM 40 MILLIGRAM(S): 60 INJECTION INTRAVENOUS; SUBCUTANEOUS at 16:08

## 2024-12-28 RX ADMIN — DONEPEZIL HYDROCHLORIDE 10 MILLIGRAM(S): 5 TABLET, FILM COATED ORAL at 21:45

## 2024-12-28 RX ADMIN — POTASSIUM CHLORIDE 100 MILLIEQUIVALENT(S): 600 TABLET, FILM COATED, EXTENDED RELEASE ORAL at 06:11

## 2024-12-28 NOTE — CHART NOTE - NSCHARTNOTEFT_GEN_A_CORE
59F F, from home, ambulates independently, PMH of HTN, HLD, DM, hypothyroidism, dementia, cardiac arrest s/p ICD, current smoker who presents with aphasia, slurred speech, headache. Vitals, CBC, CMP unremarkable. Code stroke in ED. NIHSS 2, for aphasia. CTH: no bleed, no large vessel occlusion, but<50% stenosis of R carotid  bulb and 50% stenosis of proximal R ICA. Tele stroke consulted, s/p TNK. Adm to ICU for post TNK monitoring. Pt passed dysphagia screen and started on PO diet, atorvastatin. CTH at 24H post TNK showed no bleed. Pt started on ASA, plavix, DVT ppx with lovenox, and home meds memantine+donepezil for dementia. PT consulted. TTE with bubble study was ordered. Neuro Dr. Fregoso following. Pt medically stable for downgrade from ICU.    Signout provided to attending  ____ and _______.    Things to follow  [ ] TTE with bubble study  [ ] Neuro recs  [ ] PT recs  [ ] A1c 59F F, from home, ambulates independently, PMH of HTN, HLD, DM, hypothyroidism, dementia, cardiac arrest s/p ICD, current smoker who presents with aphasia, slurred speech, headache. Vitals, CBC, CMP unremarkable. Code stroke in ED. NIHSS 2, for aphasia. CTH: no bleed, no large vessel occlusion, but<50% stenosis of R carotid  bulb and 50% stenosis of proximal R ICA. Tele stroke consulted, s/p TNK. Adm to ICU for post TNK monitoring. Pt passed dysphagia screen and started on PO diet, atorvastatin. CTH at 24H post TNK showed no bleed. Pt started on ASA, plavix, DVT ppx with lovenox, and home meds memantine+donepezil for dementia. PT consulted. TTE with bubble study was ordered. Neuro Dr. Fregoso following. Pt medically stable for downgrade from ICU.    Signout provided to attending Dr. Torres and resident Dr. Evans    Things to follow  [ ] TTE with bubble study  [ ] Neuro recs  [ ] PT recs  [ ] A1c

## 2024-12-28 NOTE — PROGRESS NOTE ADULT - SUBJECTIVE AND OBJECTIVE BOX
Patient is a 59y old  Female who presents with a chief complaint of Concern for CVA s/p TNK (27 Dec 2024 22:00)      OVERNIGHT EVENTS:   No overnight events   Afebrile, hemodynamically stable     SUBJECTIVE/INTERVAL HPI: Patient seen and examined at bedside. Denies headache, vision changes, chest pain, shortness of breath, abdominal pain, nausea, vomiting, diarrhea, constipation, dysuria, swelling, and rash.      PRESSORS: [ ] YES [ ] NO  WHICH:    ICU Vital Signs Last 24 Hrs  T(C): 36 (28 Dec 2024 04:45), Max: 36.8 (27 Dec 2024 12:48)  T(F): 96.8 (28 Dec 2024 04:45), Max: 98.3 (27 Dec 2024 12:48)  HR: 64 (28 Dec 2024 06:00) (51 - 84)  BP: 133/75 (28 Dec 2024 06:00) (94/54 - 153/67)  BP(mean): 93 (28 Dec 2024 06:00) (64 - 97)  ABP: --  ABP(mean): --  RR: 13 (28 Dec 2024 06:00) (10 - 28)  SpO2: 94% (28 Dec 2024 06:00) (87% - 100%)    O2 Parameters below as of 27 Dec 2024 18:00  Patient On (Oxygen Delivery Method): room air          I&O's Summary    27 Dec 2024 07:01  -  28 Dec 2024 07:00  --------------------------------------------------------  IN: 0 mL / OUT: 900 mL / NET: -900 mL          PHYSICAL EXAM:  GENERAL: No acute distress   HEAD:  Atraumatic, Normocephalic  EYES: EOMI, PERRLA, conjunctiva and sclera clear  ENMT: No tonsillar erythema, exudates, or enlargement; Moist mucous membranes  NECK: Supple, No JVD, Normal thyroid  HEART: Regular rate and rhythm; No murmurs, rubs, or gallops  RESPIRATORY: CTA B/L, No W/R/R  ABDOMEN: Soft, Nontender, Nondistended; Bowel sounds present  NEUROLOGY: A&Ox3, nonfocal, moving all extremities  EXTREMITIES:  2+ Peripheral Pulses, No clubbing, cyanosis, or edema  SKIN: warm, dry, normal color, no rash or abnormal lesions    LABS:                        13.2   7.80  )-----------( 258      ( 28 Dec 2024 03:21 )             40.8     12-28    140  |  112[H]  |  9   ----------------------------<  97  3.4[L]   |  25  |  0.59    Ca    8.5      28 Dec 2024 03:21  Phos  3.1     12-28  Mg     1.9     12-28    TPro  6.1  /  Alb  2.8[L]  /  TBili  0.4  /  DBili  x   /  AST  26  /  ALT  34  /  AlkPhos  99  12-28    PT/INR - ( 27 Dec 2024 13:00 )   PT: 11.1 sec;   INR: 0.96 ratio         PTT - ( 27 Dec 2024 13:00 )  PTT:37.1 sec  Urinalysis Basic - ( 28 Dec 2024 03:21 )    Color: x / Appearance: x / SG: x / pH: x  Gluc: 97 mg/dL / Ketone: x  / Bili: x / Urobili: x   Blood: x / Protein: x / Nitrite: x   Leuk Esterase: x / RBC: x / WBC x   Sq Epi: x / Non Sq Epi: x / Bacteria: x      CAPILLARY BLOOD GLUCOSE      POCT Blood Glucose.: 119 mg/dL (27 Dec 2024 12:49)        Consultant(s) Notes Reviewed:  [x ] YES  [ ] NO    MEDICATIONS  (STANDING):  chlorhexidine 2% Cloths 1 Application(s) Topical <User Schedule>  influenza   Vaccine 0.5 milliLiter(s) IntraMuscular once  insulin lispro (ADMELOG) corrective regimen sliding scale   SubCutaneous every 6 hours  levothyroxine Injectable 90 MICROGram(s) IV Push at bedtime  potassium chloride  10 mEq/100 mL IVPB 10 milliEquivalent(s) IV Intermittent every 1 hour    MEDICATIONS  (PRN):      Care Discussed with Consultants/Other Providers [ x] YES  [ ] NO    RADIOLOGY & ADDITIONAL TESTS: Patient is a 59y old  Female who presents with a chief complaint of Concern for CVA s/p TNK (27 Dec 2024 22:00)      OVERNIGHT EVENTS:   Passed dysphagia screen in AM. Diet and PO meds were started.    SUBJECTIVE/INTERVAL HPI: Patient seen and examined at bedside. NIH = 0 in morning exam. Yuridia pain, weakness, numbness. Reports subjective dysarthria.      PRESSORS: [ ] YES [x] NO  WHICH:    ICU Vital Signs Last 24 Hrs  T(C): 36 (28 Dec 2024 04:45), Max: 36.8 (27 Dec 2024 12:48)  T(F): 96.8 (28 Dec 2024 04:45), Max: 98.3 (27 Dec 2024 12:48)  HR: 64 (28 Dec 2024 06:00) (51 - 84)  BP: 133/75 (28 Dec 2024 06:00) (94/54 - 153/67)  BP(mean): 93 (28 Dec 2024 06:00) (64 - 97)  ABP: --  ABP(mean): --  RR: 13 (28 Dec 2024 06:00) (10 - 28)  SpO2: 94% (28 Dec 2024 06:00) (87% - 100%)    O2 Parameters below as of 27 Dec 2024 18:00  Patient On (Oxygen Delivery Method): room air          I&O's Summary    27 Dec 2024 07:01  -  28 Dec 2024 07:00  --------------------------------------------------------  IN: 0 mL / OUT: 900 mL / NET: -900 mL          PHYSICAL EXAM:  GENERAL: No acute distress   HEAD:  Atraumatic, Normocephalic  EYES: EOMI, PERRLA, conjunctiva and sclera clear  ENMT: No tonsillar erythema, exudates, or enlargement; Moist mucous membranes  NECK: Supple, No JVD, Normal thyroid  HEART: Regular rate and rhythm; No murmurs, rubs, or gallops  RESPIRATORY: CTA B/L, No W/R/R  ABDOMEN: Soft, Nontender, Nondistended; Bowel sounds present  NEUROLOGY: NIH=0.  A&Ox3, nonfocal, moving all extremities. Strength 5/5 in all extremities. Sensation intact in all extremities. Face symmetric, tongue midline. No dysmetria, dysarthria, or nystagmus.  EXTREMITIES:  2+ Peripheral Pulses, No clubbing, cyanosis, or edema  SKIN: warm, dry, normal color, no rash or abnormal lesions    LABS:                        13.2   7.80  )-----------( 258      ( 28 Dec 2024 03:21 )             40.8     12-28    140  |  112[H]  |  9   ----------------------------<  97  3.4[L]   |  25  |  0.59    Ca    8.5      28 Dec 2024 03:21  Phos  3.1     12-28  Mg     1.9     12-28    TPro  6.1  /  Alb  2.8[L]  /  TBili  0.4  /  DBili  x   /  AST  26  /  ALT  34  /  AlkPhos  99  12-28    PT/INR - ( 27 Dec 2024 13:00 )   PT: 11.1 sec;   INR: 0.96 ratio         PTT - ( 27 Dec 2024 13:00 )  PTT:37.1 sec  Urinalysis Basic - ( 28 Dec 2024 03:21 )    Color: x / Appearance: x / SG: x / pH: x  Gluc: 97 mg/dL / Ketone: x  / Bili: x / Urobili: x   Blood: x / Protein: x / Nitrite: x   Leuk Esterase: x / RBC: x / WBC x   Sq Epi: x / Non Sq Epi: x / Bacteria: x      CAPILLARY BLOOD GLUCOSE      POCT Blood Glucose.: 119 mg/dL (27 Dec 2024 12:49)        Consultant(s) Notes Reviewed:  [x ] YES  [ ] NO    MEDICATIONS  (STANDING):  chlorhexidine 2% Cloths 1 Application(s) Topical <User Schedule>  influenza   Vaccine 0.5 milliLiter(s) IntraMuscular once  insulin lispro (ADMELOG) corrective regimen sliding scale   SubCutaneous every 6 hours  levothyroxine Injectable 90 MICROGram(s) IV Push at bedtime  potassium chloride  10 mEq/100 mL IVPB 10 milliEquivalent(s) IV Intermittent every 1 hour    MEDICATIONS  (PRN):      Care Discussed with Consultants/Other Providers [ x] YES  [ ] NO

## 2024-12-28 NOTE — PROGRESS NOTE ADULT - ASSESSMENT
58 y/o F with PMHx of HTN, HLD, DM, hypothyroidism, dementia, cardiac arrest s/p ICD, current smoker who presents with aphasia. Code stroke in ED. NIHSS 2, for aphasia. Tele stroke consulted, pt s/p TNK 1:30PM 12/27. Adm to ICU for post TNK management.      ===Neuro:===  #AOX3, currently at baseline (has some difficulty identifying year)    #CVA  Pt presents with aphasia, NIHSS 2 in ED  CT stroke protocol negative on admission  CT with no large vessel stenosis or occlusion. <50% stenosis of R carotid  bulb and 50% stenosis of proximal R ICA.  - NIH = 0 in AM exam 12/28  - C/w atorvastatin 80qhs  - PO diet as passed dysphagia  - C/w Neuro checks  every 15 minutes for two hours, then every 30 minutes for six hours, then every 60 minutes until 24 hours from the start of thrombolysis  - C/w TELE monitoring  - F/u Echo w/bubble  - Atorvastatin 80 QD for dyslipidemia noted  - repeat CTH 1:30PM on 12/28; if no bleed, start ASA+plavix+DVT ppx  - Neuro Dr. Fregoso following    ===Cardiovascular:===  #HTN, chronic  - not on meds currently     #HLD  Pt has a history of HLD  - C/w atorvastatin    #h/o cardiac arrest  pt has ICD in place    ===Pulmonary:===   #no active issues    ===Infectious Diseases:===  #No active issues    ===Gastrointestinal:===  Passed dysphagia, 12H post TNK, started PO diet    ===Renal:===  #No active issues    ===Heme/onc: ===  #No active issues    ===Endo: ===  #DM  Takes ozempic at home  A1c 6.4 from April 2024  - F/u A1c  - ISS    #Hypothyroidism  #Multinodular Goiter  Outpatient FNA done for multinodular goiter back in July 2024, was benign  Pt has a history of hypothyroidism, takes Levothyroxine 125mcg daily at home  - TSH 0.32  - C/w Levothyroxine 125mcg daily    ===Skin/ catheter: ===  #Peripheral Lines    ===Prophylaxis: ===  #HOLD CHEMICAL DVT PPX until after repeat CTH   - SCDs    Goals of Care: FULL CODE    Dispo: ICU 60 y/o F with PMHx of HTN, HLD, DM, hypothyroidism, dementia, cardiac arrest s/p ICD, current smoker who presents with aphasia. Code stroke in ED. NIHSS 2, for aphasia. Tele stroke consulted, pt s/p TNK 1:30PM 12/27. Adm to ICU for post TNK management.      ===Neuro:===  #CVA  Pt presents with aphasia, NIHSS 2 in ED  CT stroke protocol negative on admission  CT with no large vessel stenosis or occlusion. <50% stenosis of R carotid  bulb and 50% stenosis of proximal R ICA.  - NIH = 0 in AM exam 12/28  - C/w atorvastatin 80qhs  - PO diet as passed dysphagia  - C/w Neuro checks  every 15 minutes for two hours, then every 30 minutes for six hours, then every 60 minutes until 24 hours from the start of thrombolysis  - C/w TELE monitoring  - F/u Echo w/bubble  - Atorvastatin 80 QD for dyslipidemia noted  - repeat CTH 1:30PM on 12/28; if no bleed, start ASA+plavix+DVT ppx  - Neuro Dr. Fregoso following    #Dementia, mild  - A&Ox3 in AM exam  - Resume home meds donepezil and memantine    ===Cardiovascular:===  #HTN, chronic  - not on home meds currently     #HLD  Pt has a history of HLD  - C/w atorvastatin    #h/o cardiac arrest  pt has ICD in place    ===Pulmonary:===   #no active issues    ===Infectious Diseases:===  #No active issues    ===Gastrointestinal:===  Passed dysphagia, 12H post TNK, started PO diet    ===Renal:===  #No active issues    ===Heme/onc: ===  #No active issues    ===Endo: ===  #DM  Takes ozempic at home  A1c 6.4 from April 2024  - F/u A1c  - ISS    #Hypothyroidism  #Multinodular Goiter  Outpatient FNA done for multinodular goiter back in July 2024, was benign  Pt has a history of hypothyroidism, takes Levothyroxine 125mcg daily at home  - TSH 0.32  - C/w Levothyroxine 125mcg daily    ===Skin/ catheter: ===  #Peripheral Lines    ===Prophylaxis: ===  #HOLD CHEMICAL DVT PPX until after repeat CTH   - SCDs    Goals of Care: FULL CODE    Dispo: ICU

## 2024-12-29 LAB
ALBUMIN SERPL ELPH-MCNC: 3.1 G/DL — LOW (ref 3.5–5)
ALP SERPL-CCNC: 102 U/L — SIGNIFICANT CHANGE UP (ref 40–120)
ALT FLD-CCNC: 34 U/L DA — SIGNIFICANT CHANGE UP (ref 10–60)
ANION GAP SERPL CALC-SCNC: 7 MMOL/L — SIGNIFICANT CHANGE UP (ref 5–17)
AST SERPL-CCNC: 23 U/L — SIGNIFICANT CHANGE UP (ref 10–40)
BILIRUB SERPL-MCNC: 0.4 MG/DL — SIGNIFICANT CHANGE UP (ref 0.2–1.2)
BUN SERPL-MCNC: 12 MG/DL — SIGNIFICANT CHANGE UP (ref 7–18)
CALCIUM SERPL-MCNC: 8.8 MG/DL — SIGNIFICANT CHANGE UP (ref 8.4–10.5)
CHLORIDE SERPL-SCNC: 111 MMOL/L — HIGH (ref 96–108)
CO2 SERPL-SCNC: 23 MMOL/L — SIGNIFICANT CHANGE UP (ref 22–31)
CREAT SERPL-MCNC: 0.78 MG/DL — SIGNIFICANT CHANGE UP (ref 0.5–1.3)
EGFR: 87 ML/MIN/1.73M2 — SIGNIFICANT CHANGE UP
GLUCOSE BLDC GLUCOMTR-MCNC: 114 MG/DL — HIGH (ref 70–99)
GLUCOSE BLDC GLUCOMTR-MCNC: 123 MG/DL — HIGH (ref 70–99)
GLUCOSE BLDC GLUCOMTR-MCNC: 127 MG/DL — HIGH (ref 70–99)
GLUCOSE BLDC GLUCOMTR-MCNC: 84 MG/DL — SIGNIFICANT CHANGE UP (ref 70–99)
GLUCOSE SERPL-MCNC: 125 MG/DL — HIGH (ref 70–99)
HCT VFR BLD CALC: 40.3 % — SIGNIFICANT CHANGE UP (ref 34.5–45)
HGB BLD-MCNC: 13.1 G/DL — SIGNIFICANT CHANGE UP (ref 11.5–15.5)
MAGNESIUM SERPL-MCNC: 2.1 MG/DL — SIGNIFICANT CHANGE UP (ref 1.6–2.6)
MCHC RBC-ENTMCNC: 28.2 PG — SIGNIFICANT CHANGE UP (ref 27–34)
MCHC RBC-ENTMCNC: 32.5 G/DL — SIGNIFICANT CHANGE UP (ref 32–36)
MCV RBC AUTO: 86.9 FL — SIGNIFICANT CHANGE UP (ref 80–100)
NRBC # BLD: 0 /100 WBCS — SIGNIFICANT CHANGE UP (ref 0–0)
PHOSPHATE SERPL-MCNC: 3.5 MG/DL — SIGNIFICANT CHANGE UP (ref 2.5–4.5)
PLATELET # BLD AUTO: 258 K/UL — SIGNIFICANT CHANGE UP (ref 150–400)
POTASSIUM SERPL-MCNC: 3.1 MMOL/L — LOW (ref 3.5–5.3)
POTASSIUM SERPL-SCNC: 3.1 MMOL/L — LOW (ref 3.5–5.3)
PROT SERPL-MCNC: 6.4 G/DL — SIGNIFICANT CHANGE UP (ref 6–8.3)
RBC # BLD: 4.64 M/UL — SIGNIFICANT CHANGE UP (ref 3.8–5.2)
RBC # FLD: 13.8 % — SIGNIFICANT CHANGE UP (ref 10.3–14.5)
SODIUM SERPL-SCNC: 141 MMOL/L — SIGNIFICANT CHANGE UP (ref 135–145)
WBC # BLD: 6.86 K/UL — SIGNIFICANT CHANGE UP (ref 3.8–10.5)
WBC # FLD AUTO: 6.86 K/UL — SIGNIFICANT CHANGE UP (ref 3.8–10.5)

## 2024-12-29 PROCEDURE — 99232 SBSQ HOSP IP/OBS MODERATE 35: CPT

## 2024-12-29 RX ORDER — POTASSIUM CHLORIDE 600 MG/1
40 TABLET, FILM COATED, EXTENDED RELEASE ORAL ONCE
Refills: 0 | Status: COMPLETED | OUTPATIENT
Start: 2024-12-29 | End: 2024-12-29

## 2024-12-29 RX ADMIN — CHLORHEXIDINE GLUCONATE 1 APPLICATION(S): 1.2 RINSE ORAL at 06:03

## 2024-12-29 RX ADMIN — POTASSIUM CHLORIDE 40 MILLIEQUIVALENT(S): 600 TABLET, FILM COATED, EXTENDED RELEASE ORAL at 12:12

## 2024-12-29 RX ADMIN — Medication 81 MILLIGRAM(S): at 12:12

## 2024-12-29 RX ADMIN — CLOPIDOGREL BISULFATE 75 MILLIGRAM(S): 75 TABLET, FILM COATED ORAL at 12:12

## 2024-12-29 RX ADMIN — DONEPEZIL HYDROCHLORIDE 10 MILLIGRAM(S): 5 TABLET, FILM COATED ORAL at 22:07

## 2024-12-29 RX ADMIN — ENOXAPARIN SODIUM 40 MILLIGRAM(S): 60 INJECTION INTRAVENOUS; SUBCUTANEOUS at 17:14

## 2024-12-29 RX ADMIN — MEMANTINE HYDROCHLORIDE 5 MILLIGRAM(S): 14 CAPSULE, EXTENDED RELEASE ORAL at 06:01

## 2024-12-29 RX ADMIN — LEVOTHYROXINE SODIUM 125 MICROGRAM(S): 175 TABLET ORAL at 06:01

## 2024-12-29 RX ADMIN — MEMANTINE HYDROCHLORIDE 5 MILLIGRAM(S): 14 CAPSULE, EXTENDED RELEASE ORAL at 17:14

## 2024-12-29 RX ADMIN — ATORVASTATIN CALCIUM 80 MILLIGRAM(S): 40 TABLET, FILM COATED ORAL at 22:07

## 2024-12-29 NOTE — PROGRESS NOTE ADULT - SUBJECTIVE AND OBJECTIVE BOX
Vital Signs Last 24 Hrs  T(C): 36.3 (29 Dec 2024 11:13), Max: 37.1 (28 Dec 2024 20:58)  T(F): 97.3 (29 Dec 2024 11:13), Max: 98.8 (28 Dec 2024 20:58)  HR: 66 (29 Dec 2024 11:13) (62 - 79)  BP: 116/63 (29 Dec 2024 11:13) (111/68 - 131/64)  BP(mean): --  RR: 18 (29 Dec 2024 11:13) (17 - 18)  SpO2: 98% (29 Dec 2024 11:13) (94% - 99%)    Parameters below as of 29 Dec 2024 11:13  Patient On (Oxygen Delivery Method): room air                          13.1   6.86  )-----------( 258      ( 29 Dec 2024 05:30 )             40.3     12-29    141  |  111[H]  |  12  ----------------------------<  125[H]  3.1[L]   |  23  |  0.78    Ca    8.8      29 Dec 2024 05:30  Phos  3.5     12-29  Mg     2.1     12-29    TPro  6.4  /  Alb  3.1[L]  /  TBili  0.4  /  DBili  x   /  AST  23  /  ALT  34  /  AlkPhos  102  12-29

## 2024-12-29 NOTE — PROGRESS NOTE ADULT - ASSESSMENT
Patient seen and examined at bedside this morning. Noted to be walking from bathroom back to chair, with minimal help from  at bedside. Per  at bedside, the patient still occasionally stuttering. She denies any other new deficits, including sensory or strength.  Vitals, labs reviewed as above. VSS. K noted 3.1, cbc wnl.  PE - NAD, NC/AT, no facial droop, RRR, lungs cta, abd soft ntnd, extremities wwp, no changes in b/l UE or LE for sensory or strength changes    A/P   60yo F with PMH of HTN, HLD, DM, hypothyroidism, dementia, cardiac arrest s/p ICD, current smoker who presents with aphasia, slurred speech, headache, NIHSS 2, admitted for CT findings 50% stenosis of proximal R ICA, tele stroke consulted, s/p TNK, admitted to ICU for monitoring, then downgraded to medicine for further management.    #Aphasia - concern for CVA s/p TNK   #HTN   #HLD   #DM   #Hypothyroidism   #H/o dementia, cardiac arrest     -c/w asa, plavix, statin   -MRI brain   -neurology following/recs from Dr Fregoso   -TTE w bubble   -PT eval   -a1c noted 6, monitor FS/ISS   -resumed on home meds memantine, donepezil   -c/w levothyroxine   -replete K  -diet as tolerated   -dvt ppx lovenox

## 2024-12-29 NOTE — PHYSICAL THERAPY INITIAL EVALUATION ADULT - MODIFIED CLINICAL TEST OF SENSORY INTEGRATION IN BALANCE TEST
Postural Assessment Stroke Scale Total Score (PASS): 35/36 Total; (Maintain Posture Subscale = 15/15)+(Change Posture Subscale = 20/21): Lower scores equates to greater impairment in postural control.

## 2024-12-29 NOTE — PHYSICAL THERAPY INITIAL EVALUATION ADULT - DIAGNOSIS, PT EVAL
Pt. present w/deficits in  Body Structures/Function including strength, balance, concern for CVA leading to deficits in performing bed mobility, transfer and ambulation endurance.

## 2024-12-29 NOTE — PHYSICAL THERAPY INITIAL EVALUATION ADULT - ADDITIONAL COMMENTS
Patient lives in a private house with stairs (x 2 flights), no assistive device needs prior to admission.

## 2024-12-29 NOTE — PHYSICAL THERAPY INITIAL EVALUATION ADULT - GENERAL OBSERVATIONS, REHAB EVAL
Patient was seen for PT evaluation today. EMR, laboratory and radiology results reviewed. Pt received supine in bed, NAD, Telemetry in situ

## 2024-12-29 NOTE — PHYSICAL THERAPY INITIAL EVALUATION ADULT - PHYSICAL ASSIST/NONPHYSICAL ASSIST: BED TO CHAIR, REHAB EVAL
Pt complains of itchiness to anterior chest. Area localized, observed to be red & raised.
verbal cues/1 person assist

## 2024-12-29 NOTE — PROGRESS NOTE ADULT - SUBJECTIVE AND OBJECTIVE BOX
NEUROLOGY FOLLOW-UP NOTE    NAME:  MEME MURPHY      ASSESSMENT:  59 RHF with acute onset of expressive and receptive aphasia, concerning for acute ischemic stroke vs. sequelae of dementia, s/p IV TNK administration      RECOMMENDATIONS:    1. Stroke workup  - Repeat CT Head 24 hours after IV TNK administration showed no acute intracranial hemorrhage  - MRI Brain approved to evaluate for acute intracranial abnormalities (if there are no contraindications)  - Transthoracic Echocardiogram  - Telemetry monitoring while inpatient    2. Secondary stroke prevention  - Q4H Neurochecks & Vital signs  - Patient has passed a bedside swallow evaluation  - Continue Aspirin 81mg PO Daily  - Continue Clopidogrel 75mg PO Daily  - Okay to decrease Atorvastatin dosage from 80mg to home dose of 40mg PO QHS (goal LDL < 70 mg/dL)  - Treat BP if over 140/90 (goal /80) - Maintain home antihypertensive medications, No role for permissive hypertension  - PT/OT  - DVT ppx: SCDs    3. Unspecified Dementia  - Continue home Donepezil 10mg PO Daily and Memantine 5mg PO BID to help slow down cognitive decline          NOTE TO BE COMPLETED - PLEASE REFER TO ABOVE ONLY AND IGNORE INFORMATION BELOW    ******************************    HPI:  This is a 60 y/o F, from home, ambulates independently, with PMHx of HTN, HLD, DM, hypothyroidism, dementia, cardiac arrest s/p ICD, current smoker who presents with aphasia. Pt states around 10AM she had a severe headahce and slurred speech that lasted around 2 hours. Pt also noticed she was having trouble finding her words which still has not resolved. Pt denies any recent travel, recent illness, CP, SOB, fever, chills, N/V/D, constipation, weakness, numbness or tingling.  (27 Dec 2024 15:19)      NEURO HPI:      INTERVAL HISTORY:      MEDICATIONS:  aspirin  chewable 81 milliGRAM(s) Oral daily  atorvastatin 80 milliGRAM(s) Oral at bedtime  chlorhexidine 2% Cloths 1 Application(s) Topical <User Schedule>  clopidogrel Tablet 75 milliGRAM(s) Oral daily  donepezil 10 milliGRAM(s) Oral at bedtime  enoxaparin Injectable 40 milliGRAM(s) SubCutaneous every 24 hours  influenza   Vaccine 0.5 milliLiter(s) IntraMuscular once  insulin lispro (ADMELOG) corrective regimen sliding scale   SubCutaneous three times a day before meals  levothyroxine 125 MICROGram(s) Oral daily  memantine 5 milliGRAM(s) Oral two times a day      ALLERGIES:  No Known Allergies      REVIEW OF SYSTEMS:  Fourteen systems reviewed and negative except as in HPI / Interval History.          OBJECTIVE:  Vital Signs Last 24 Hrs  T(C): 36.6 (29 Dec 2024 23:38), Max: 37.1 (29 Dec 2024 20:30)  T(F): 97.9 (29 Dec 2024 23:38), Max: 98.8 (29 Dec 2024 20:30)  HR: 65 (29 Dec 2024 23:38) (62 - 79)  BP: 113/71 (29 Dec 2024 23:38) (111/67 - 131/64)  RR: 18 (29 Dec 2024 23:38) (17 - 18)  SpO2: 95% (29 Dec 2024 23:38) (94% - 99%)  Parameters below as of 29 Dec 2024 23:38  Patient On (Oxygen Delivery Method): room air      General Examination:  General: No acute distress  HEENT: Atraumatic, Normocephalic  Respiratory: CTA B/l.  No crackles, rhonchi, or wheezes.  Cardiovascular: RRR.  Normal S1 & S2.  Normal b/l radial and pedal pulses.    Neurological Examination:  General / Mental Status: AAO x 3.  No aphasia or dysarthria.  Naming and repetition intact.  Cranial Nerves: VFF x 4.  PERRL.  EOMI x 2, No nystagmus or diplopia.  B/l V1-V3 equal and intact to light touch and pinprick.  Symmetric facial movement and palate elevation.  B/l hearing equal to finger rub.  5/5 strength with b/l sternocleidomastoid and trapezius.  Midline tongue protrusion, with no atrophy or fasciculations.  Motor: Normal bulk & tone in all four extremities.  5/5 strength throughout all four extremities.  No downward drift, rigidity, spasticity, or tremors in any of the four extremities.  Sensory: Intact to light touch and pinprick in all four extremities.  Negative Romberg.  Reflex: 2+ and symmetric at b/l biceps, triceps, brachioradialis, patellae, and ankles.  Downgoing toes b/l.  Coordination: No dysmetria with b/l finger-to-nose and heel raise tests.  Symmetric rapid alternating movements b/l.  Gait: Normal, narrow-based gait.  No difficulty with tiptoe, heel, and tandem gaits.          LABORATORY VALUES:                          13.1   6.86  )-----------( 258      ( 29 Dec 2024 05:30 )             40.3       12-29    141  |  111[H]  |  12  ----------------------------<  125[H]  3.1[L]   |  23  |  0.78    Ca    8.8      29 Dec 2024 05:30  Phos  3.5     12-29  Mg     2.1     12-29    TPro  6.4  /  Alb  3.1[L]  /  TBili  0.4  /  DBili  x   /  AST  23  /  ALT  34  /  AlkPhos  102  12-29 12-28 Chol 161 LDL 81 HDL 44[L] Trig 213[H]    Glucose Trend  12-29-24 @ 21:08   -  -- -- 123[H]  12-29-24 @ 16:44   -  -- -- 127[H]  12-29-24 @ 11:28   -  -- -- 84  12-29-24 @ 07:46   -  -- -- 114[H]  12-29-24 @ 05:30   -  125[H] -- --  12-28-24 @ 21:32   -  -- -- 119[H]  12-28-24 @ 16:13   -  -- -- 179[H]  12-28-24 @ 11:58   -  -- -- 120[H]  12-28-24 @ 03:21   -  97 -- --  12-27-24 @ 13:00   -  132[H] -- --    A1C with Estimated Average Glucose (12.28.24 @ 03:21)   A1C with Estimated Average Glucose Result: 6.0%  Estimated Average Glucose: 126 mg/dL          NEUROIMAGING:          Please contact the Neurology consult service with any neurological questions.      Brian Fregoso MD   of Neurology  [facility] [employer] [facility]

## 2024-12-29 NOTE — PHYSICAL THERAPY INITIAL EVALUATION ADULT - NSPTDISCHREC_GEN_A_CORE
Patient will not be placed in Rehab program in-house as she is already independent in transfer and ambulation, however she will benefit with an OPT upon discharge for general body conditioning./Outpatient PT

## 2024-12-29 NOTE — PHYSICAL THERAPY INITIAL EVALUATION ADULT - FUNCTIONAL LIMITATIONS, PT EVAL
MODIFIED ALIN SCALE: 1: No Significant Disability: Able to carry out all usual duties and activities./self-care/home management/work

## 2024-12-30 ENCOUNTER — TRANSCRIPTION ENCOUNTER (OUTPATIENT)
Age: 59
End: 2024-12-30

## 2024-12-30 ENCOUNTER — RESULT REVIEW (OUTPATIENT)
Age: 59
End: 2024-12-30

## 2024-12-30 VITALS
TEMPERATURE: 98 F | RESPIRATION RATE: 18 BRPM | SYSTOLIC BLOOD PRESSURE: 119 MMHG | HEART RATE: 71 BPM | OXYGEN SATURATION: 97 % | DIASTOLIC BLOOD PRESSURE: 56 MMHG

## 2024-12-30 DIAGNOSIS — E11.9 TYPE 2 DIABETES MELLITUS WITHOUT COMPLICATIONS: ICD-10-CM

## 2024-12-30 DIAGNOSIS — Z86.74 PERSONAL HISTORY OF SUDDEN CARDIAC ARREST: ICD-10-CM

## 2024-12-30 DIAGNOSIS — E03.9 HYPOTHYROIDISM, UNSPECIFIED: ICD-10-CM

## 2024-12-30 DIAGNOSIS — E78.5 HYPERLIPIDEMIA, UNSPECIFIED: ICD-10-CM

## 2024-12-30 DIAGNOSIS — I10 ESSENTIAL (PRIMARY) HYPERTENSION: ICD-10-CM

## 2024-12-30 DIAGNOSIS — I63.9 CEREBRAL INFARCTION, UNSPECIFIED: ICD-10-CM

## 2024-12-30 DIAGNOSIS — Z29.9 ENCOUNTER FOR PROPHYLACTIC MEASURES, UNSPECIFIED: ICD-10-CM

## 2024-12-30 LAB
ALBUMIN SERPL ELPH-MCNC: 3 G/DL — LOW (ref 3.5–5)
ALP SERPL-CCNC: 105 U/L — SIGNIFICANT CHANGE UP (ref 40–120)
ALT FLD-CCNC: 37 U/L DA — SIGNIFICANT CHANGE UP (ref 10–60)
ANION GAP SERPL CALC-SCNC: 7 MMOL/L — SIGNIFICANT CHANGE UP (ref 5–17)
AST SERPL-CCNC: 26 U/L — SIGNIFICANT CHANGE UP (ref 10–40)
BILIRUB SERPL-MCNC: 0.4 MG/DL — SIGNIFICANT CHANGE UP (ref 0.2–1.2)
BUN SERPL-MCNC: 13 MG/DL — SIGNIFICANT CHANGE UP (ref 7–18)
CALCIUM SERPL-MCNC: 8.6 MG/DL — SIGNIFICANT CHANGE UP (ref 8.4–10.5)
CHLORIDE SERPL-SCNC: 111 MMOL/L — HIGH (ref 96–108)
CO2 SERPL-SCNC: 24 MMOL/L — SIGNIFICANT CHANGE UP (ref 22–31)
CREAT SERPL-MCNC: 0.8 MG/DL — SIGNIFICANT CHANGE UP (ref 0.5–1.3)
EGFR: 85 ML/MIN/1.73M2 — SIGNIFICANT CHANGE UP
GLUCOSE BLDC GLUCOMTR-MCNC: 116 MG/DL — HIGH (ref 70–99)
GLUCOSE BLDC GLUCOMTR-MCNC: 120 MG/DL — HIGH (ref 70–99)
GLUCOSE BLDC GLUCOMTR-MCNC: 174 MG/DL — HIGH (ref 70–99)
GLUCOSE SERPL-MCNC: 118 MG/DL — HIGH (ref 70–99)
HCT VFR BLD CALC: 41.6 % — SIGNIFICANT CHANGE UP (ref 34.5–45)
HGB BLD-MCNC: 13.2 G/DL — SIGNIFICANT CHANGE UP (ref 11.5–15.5)
MAGNESIUM SERPL-MCNC: 2.2 MG/DL — SIGNIFICANT CHANGE UP (ref 1.6–2.6)
MCHC RBC-ENTMCNC: 28 PG — SIGNIFICANT CHANGE UP (ref 27–34)
MCHC RBC-ENTMCNC: 31.7 G/DL — LOW (ref 32–36)
MCV RBC AUTO: 88.3 FL — SIGNIFICANT CHANGE UP (ref 80–100)
NRBC # BLD: 0 /100 WBCS — SIGNIFICANT CHANGE UP (ref 0–0)
PHOSPHATE SERPL-MCNC: 3.5 MG/DL — SIGNIFICANT CHANGE UP (ref 2.5–4.5)
PLATELET # BLD AUTO: 258 K/UL — SIGNIFICANT CHANGE UP (ref 150–400)
POTASSIUM SERPL-MCNC: 3.6 MMOL/L — SIGNIFICANT CHANGE UP (ref 3.5–5.3)
POTASSIUM SERPL-SCNC: 3.6 MMOL/L — SIGNIFICANT CHANGE UP (ref 3.5–5.3)
PROT SERPL-MCNC: 6.4 G/DL — SIGNIFICANT CHANGE UP (ref 6–8.3)
RBC # BLD: 4.71 M/UL — SIGNIFICANT CHANGE UP (ref 3.8–5.2)
RBC # FLD: 13.5 % — SIGNIFICANT CHANGE UP (ref 10.3–14.5)
SODIUM SERPL-SCNC: 142 MMOL/L — SIGNIFICANT CHANGE UP (ref 135–145)
WBC # BLD: 6.86 K/UL — SIGNIFICANT CHANGE UP (ref 3.8–10.5)
WBC # FLD AUTO: 6.86 K/UL — SIGNIFICANT CHANGE UP (ref 3.8–10.5)

## 2024-12-30 PROCEDURE — 96374 THER/PROPH/DIAG INJ IV PUSH: CPT

## 2024-12-30 PROCEDURE — 85025 COMPLETE CBC W/AUTO DIFF WBC: CPT

## 2024-12-30 PROCEDURE — 85027 COMPLETE CBC AUTOMATED: CPT

## 2024-12-30 PROCEDURE — 83036 HEMOGLOBIN GLYCOSYLATED A1C: CPT

## 2024-12-30 PROCEDURE — 93306 TTE W/DOPPLER COMPLETE: CPT

## 2024-12-30 PROCEDURE — 70551 MRI BRAIN STEM W/O DYE: CPT | Mod: 26

## 2024-12-30 PROCEDURE — 70450 CT HEAD/BRAIN W/O DYE: CPT | Mod: MC

## 2024-12-30 PROCEDURE — 97161 PT EVAL LOW COMPLEX 20 MIN: CPT

## 2024-12-30 PROCEDURE — 99285 EMERGENCY DEPT VISIT HI MDM: CPT | Mod: 25

## 2024-12-30 PROCEDURE — 70551 MRI BRAIN STEM W/O DYE: CPT | Mod: MC

## 2024-12-30 PROCEDURE — 80061 LIPID PANEL: CPT

## 2024-12-30 PROCEDURE — 83735 ASSAY OF MAGNESIUM: CPT

## 2024-12-30 PROCEDURE — 36415 COLL VENOUS BLD VENIPUNCTURE: CPT

## 2024-12-30 PROCEDURE — 85610 PROTHROMBIN TIME: CPT

## 2024-12-30 PROCEDURE — 84484 ASSAY OF TROPONIN QUANT: CPT

## 2024-12-30 PROCEDURE — 0042T: CPT | Mod: MC

## 2024-12-30 PROCEDURE — 85730 THROMBOPLASTIN TIME PARTIAL: CPT

## 2024-12-30 PROCEDURE — 71045 X-RAY EXAM CHEST 1 VIEW: CPT

## 2024-12-30 PROCEDURE — 93289 INTERROG DEVICE EVAL HEART: CPT | Mod: 26

## 2024-12-30 PROCEDURE — 93005 ELECTROCARDIOGRAM TRACING: CPT

## 2024-12-30 PROCEDURE — 70496 CT ANGIOGRAPHY HEAD: CPT | Mod: MC

## 2024-12-30 PROCEDURE — 82962 GLUCOSE BLOOD TEST: CPT

## 2024-12-30 PROCEDURE — 71045 X-RAY EXAM CHEST 1 VIEW: CPT | Mod: 26

## 2024-12-30 PROCEDURE — 84443 ASSAY THYROID STIM HORMONE: CPT

## 2024-12-30 PROCEDURE — 99239 HOSP IP/OBS DSCHRG MGMT >30: CPT | Mod: GC

## 2024-12-30 PROCEDURE — 87641 MR-STAPH DNA AMP PROBE: CPT

## 2024-12-30 PROCEDURE — 80053 COMPREHEN METABOLIC PANEL: CPT

## 2024-12-30 PROCEDURE — 87640 STAPH A DNA AMP PROBE: CPT

## 2024-12-30 PROCEDURE — 70498 CT ANGIOGRAPHY NECK: CPT | Mod: MC

## 2024-12-30 PROCEDURE — 84100 ASSAY OF PHOSPHORUS: CPT

## 2024-12-30 RX ORDER — ATORVASTATIN CALCIUM 40 MG/1
40 TABLET, FILM COATED ORAL AT BEDTIME
Refills: 0 | Status: DISCONTINUED | OUTPATIENT
Start: 2024-12-30 | End: 2024-12-30

## 2024-12-30 RX ORDER — ASPIRIN 81 MG
1 TABLET, DELAYED RELEASE (ENTERIC COATED) ORAL
Qty: 30 | Refills: 3
Start: 2024-12-30 | End: 2025-04-28

## 2024-12-30 RX ORDER — CLOPIDOGREL BISULFATE 75 MG/1
1 TABLET, FILM COATED ORAL
Qty: 19 | Refills: 0
Start: 2024-12-30 | End: 2025-01-17

## 2024-12-30 RX ADMIN — CLOPIDOGREL BISULFATE 75 MILLIGRAM(S): 75 TABLET, FILM COATED ORAL at 11:59

## 2024-12-30 RX ADMIN — Medication 1: at 12:02

## 2024-12-30 RX ADMIN — LEVOTHYROXINE SODIUM 125 MICROGRAM(S): 175 TABLET ORAL at 05:33

## 2024-12-30 RX ADMIN — CHLORHEXIDINE GLUCONATE 1 APPLICATION(S): 1.2 RINSE ORAL at 05:33

## 2024-12-30 RX ADMIN — MEMANTINE HYDROCHLORIDE 5 MILLIGRAM(S): 14 CAPSULE, EXTENDED RELEASE ORAL at 19:26

## 2024-12-30 RX ADMIN — Medication 81 MILLIGRAM(S): at 11:59

## 2024-12-30 RX ADMIN — MEMANTINE HYDROCHLORIDE 5 MILLIGRAM(S): 14 CAPSULE, EXTENDED RELEASE ORAL at 05:32

## 2024-12-30 NOTE — DISCHARGE NOTE PROVIDER - ATTENDING DISCHARGE PHYSICAL EXAMINATION:
Gen: NAD, sitting up in bed, slightly slow speech occasionally pausing   NC/AT, no facial droop  RRR, +s1/s2  Lungs CTA b/l, nl respiratory effort  Abd soft ntd, nl bowel sounds  no LE edema

## 2024-12-30 NOTE — CHART NOTE - NSCHARTNOTEFT_GEN_A_CORE
ELECTROPHYSIOLOGY    Device : Visia  AF MRI  VR  [id number] ([facility])  SN#  [id number]  Implant date: 2019    Estimated battery longevity:  6.2 years    Mode: VVI    Lower rate: 40 bpm    Ventricular Tachy:  AF       Monitor  VF       ON              >207 bpm           ATP During Charging, 35J X6  FVT      OFF                                       All Rx OFF      VT        ON             188-207 bpm       Burst (3), 20J,  35J x 4      Leads:    RV (6947M)  SVC    Sensin.30mV    Impedance:  418 ohms    Threshold: 1.50V @ 0.4ms    Measured R wave: 9.9mV    Programmed Amplitude / Pulse width:  3.0 V @0.4 ms    Defibrillation Impedance  RV=52 ohms                                      SVC=67  ohms    Counters (Since 2023)  VS   100.0%   <0.1%      Episodes: 1 episode of NSVT     Changes made: None    Conclusion: Normal device function.   The device is MRI Conditional

## 2024-12-30 NOTE — PROGRESS NOTE ADULT - PROBLEM SELECTOR PLAN 7
Hypothyroidism  Multinodular Goiter  Outpatient FNA done for multinodular goiter back in July 2024, was benign  Pt has a history of hypothyroidism, takes Levothyroxine 125mcg daily at home  TSH 0.32  on 12/28/24    - C/w Levothyroxine 125mcg daily Hypothyroidism  Multinodular Goiter  Outpatient FNA done for multinodular goiter back in July 2024, was benign  Pt has a history of hypothyroidism, takes Levothyroxine 125mcg daily at home  TSH 0.32      - C/w Levothyroxine 125mcg daily

## 2024-12-30 NOTE — PROGRESS NOTE ADULT - ATTENDING COMMENTS
60 y/o F with PMHx of HTN, HLD, DM, hypothyroidism, dementia, cardiac arrest s/p ICD, current smoker who presents with aphasia. Code stroke in ED. NIHSS 2, for aphasia. Tele stroke consulted, pt s/p TNK 1:30PM 12/27. Adm to ICU for post TNK management.      ===Neuro:===  #CVA  Pt presents with aphasia, NIHSS 2 in ED  CT stroke protocol negative on admission  CT with no large vessel stenosis or occlusion. <50% stenosis of R carotid  bulb and 50% stenosis of proximal R ICA.  - NIH = 0 in AM exam 12/28  - C/w atorvastatin 80qhs  - PO diet as passed dysphagia  - C/w Neuro checks  every 15 minutes for two hours, then every 30 minutes for six hours, then every 60 minutes until 24 hours from the start of thrombolysis  - C/w TELE monitoring  - F/u Echo w/bubble  - Atorvastatin 80 QD for dyslipidemia noted  - repeat CTH 1:30PM on 12/28; if no bleed, start ASA+plavix+DVT ppx  - Neuro Dr. Fregoso following    #Dementia, mild  - A&Ox3 in AM exam  - Resume home meds donepezil and memantine    ===Cardiovascular:===  #HTN, chronic  - not on home meds currently     #HLD  Pt has a history of HLD  - C/w atorvastatin    #h/o cardiac arrest  pt has ICD in place  ===Endo: ===  #DM  Takes ozempic at home  A1c 6.4 from April 2024  - F/u A1c  - ISS    #Hypothyroidism  #Multinodular Goiter  Outpatient FNA done for multinodular goiter back in July 2024, was benign  Pt has a history of hypothyroidism, takes Levothyroxine 125mcg daily at home  - TSH 0.32  - C/w Levothyroxine 125mcg daily    ===Skin/ catheter: ===  #Peripheral Lines    ===Prophylaxis: ===  #HOLD CHEMICAL DVT PPX until after repeat CTH   - SCDs
Patient completed all pending tests, now ready for discharge, see discharge summary dated from 12/30/24.

## 2024-12-30 NOTE — PROGRESS NOTE ADULT - PROBLEM SELECTOR PLAN 1
#CVA  Pt presents with aphasia, NIHSS 2 in ED  CT stroke protocol negative on admission  CT with no large vessel stenosis or occlusion. <50% stenosis of R carotid  bulb and 50% stenosis of proximal R ICA.  NIH = 0 in AM exam 12/28  PO diet as passed dysphagia    - C/w atorvastatin 40mg PO  - C/w TELE monitoring  - F/u Echo w/bubble  - MRI   - c/w ASA+plavix  - Neuro Dr. Fregoso following #CVA  Pt presents with aphasia, NIHSS 2 in ED  CT stroke protocol negative on admission  CT with no large vessel stenosis or occlusion. <50% stenosis of R carotid  bulb and 50% stenosis of proximal R ICA.  NIH = 0 in AM exam 12/28  PO diet as passed dysphagia    12/30 - Aphasia resolved, NIH = 0  - reduced to atorvastin 40mg qhs, ASA 81mg qd, pavix 75mg qd  - C/w TELE monitoring  - F/u Echo w/bubble  - f/u MRI brain  - Neuro Dr. Fregoso following

## 2024-12-30 NOTE — PROGRESS NOTE ADULT - ASSESSMENT
58 y/o F with PMHx of HTN, HLD, DM, hypothyroidism, dementia, cardiac arrest s/p ICD, current smoker who presents with aphasia. Code stroke in ED. NIHSS 2, for aphasia. Tele stroke consulted, pt s/p TNK 1:30PM 12/27. Admitted to ICU for monitoring s/p TNK, downgraded to medicine for further management.

## 2024-12-30 NOTE — DISCHARGE NOTE PROVIDER - NSDCMRMEDTOKEN_GEN_ALL_CORE_FT
amitriptyline 10 mg oral tablet: 1 tab(s) orally once a day (at bedtime)  atorvastatin 40 mg oral tablet: 1 tab(s) orally once a day (at bedtime)  donepezil 10 mg oral tablet: 1 tab(s) orally once a day  Icosapent Ethyl 1 g oral capsule: 2 cap(s) orally 2 times a day  levothyroxine 125 mcg (0.125 mg) oral tablet: 1 tab(s) orally once a day  memantine 5 mg oral tablet: 1 tab(s) orally 2 times a day  nebivolol 5 mg oral tablet: 1 tab(s) orally once a day  Ozempic 4 mg/3 mL (1 mg dose) subcutaneous solution: 1 milligram(s) subcutaneously once a week   amitriptyline 10 mg oral tablet: 1 tab(s) orally once a day (at bedtime)  aspirin 81 mg oral capsule: 1 cap(s) orally once a day Please take lifelong  atorvastatin 40 mg oral tablet: 1 tab(s) orally once a day (at bedtime)  donepezil 10 mg oral tablet: 1 tab(s) orally once a day  Icosapent Ethyl 1 g oral capsule: 2 cap(s) orally 2 times a day  levothyroxine 125 mcg (0.125 mg) oral tablet: 1 tab(s) orally once a day  memantine 5 mg oral tablet: 1 tab(s) orally 2 times a day  nebivolol 5 mg oral tablet: 1 tab(s) orally once a day  Ozempic 4 mg/3 mL (1 mg dose) subcutaneous solution: 1 milligram(s) subcutaneously once a week  Plavix 75 mg oral tablet: 1 tab(s) orally once a day Please take for 19 more days (TOTAL OF 21 days)

## 2024-12-30 NOTE — PROGRESS NOTE ADULT - PROBLEM SELECTOR PLAN 3
#HTN, chronic  - not on home meds currently #HTN, chronic  - not on home meds currently  -BP well controlled w/o antihypertensives  -monitor BP

## 2024-12-30 NOTE — PROGRESS NOTE ADULT - PROBLEM SELECTOR PLAN 5
#h/o cardiac arrest  pt has ICD in place #h/o cardiac arrest  pt has ICD in place (medtronic)  f/u cardio interrogation

## 2024-12-30 NOTE — DISCHARGE NOTE NURSING/CASE MANAGEMENT/SOCIAL WORK - PATIENT PORTAL LINK FT
You can access the FollowMyHealth Patient Portal offered by [facility] by registering at the following website: [email or web address]. By joining [employer]’s FollowMyHealth portal, you will also be able to view your health information using other applications (apps) compatible with our system.

## 2024-12-30 NOTE — DISCHARGE NOTE PROVIDER - HOSPITAL COURSE
Patient is a 58 yo F, from home, ambulates independently, PMH of HTN, HLD, DM, hypothyroidism, dementia, cardiac arrest s/p ICD, current smoker who presented to the ED with aphasia, slurred speech, headache. Vitals, CBC, CMP unremarkable. Code stroke in ED. NIHSS 2, for aphasia. CTH: no bleed, no large vessel occlusion, but<50% stenosis of R carotid  bulb and 50% stenosis of proximal R ICA. Tele stroke consulted, s/p TNK. Admitted to ICU for post TNK monitoring. Pt passed dysphagia screen and started on PO diet, increased home atorvastatin to 80mg qhs. CTH at 24H post TNK showed no bleed. Pt started on ASA, plavix, DVT ppx with lovenox, and home meds memantine+donepezil for dementia. PT consulted. TTE with bubble study was ordered. Neuro Dr. Fregoso was . Pt medically stable for downgrade from ICU.   Patient is a 60 yo F, from home, ambulates independently, PMH of HTN, HLD, DM, hypothyroidism, dementia, cardiac arrest s/p ICD, current smoker who presented to the ED with aphasia, slurred speech, headache. Vitals, CBC, CMP unremarkable. Code stroke in ED. NIHSS 2, for aphasia. CTH: no bleed, no large vessel occlusion, but<50% stenosis of R carotid  bulb and 50% stenosis of proximal R ICA. Tele stroke consulted, s/p TNK. Admitted to ICU for post TNK monitoring. Pt passed dysphagia screen and started on PO diet, increased home atorvastatin to 80mg qhs. CTH at 24H post TNK showed no bleed. Pt started on ASA, plavix, DVT ppx with lovenox, and home meds memantine+donepezil for dementia. PT consulted and no recommended against PT. Neuro Dr. Fregoso was consulted and recommendations were followed. Pt medically stable for downgrade from ICU and was monitored on telemetry with q4hour Neuro and V/S checks. Lipid panel was significant for elevated TG and normal LDL. Patient was continued on ASA 81mg daily, clopidogrel 75mg daily, and decreased atorvastatin to 40mg qhs.     Patient's aphasia improved with NIHSS 0 on 12/30am. MR brain showed _______. TTE with bubble study showed______. Patient also with hx of cardiac arrest. ICD was interrogated by cardiology who found_____. For patient hx hypothyroidism w/ multinodal goiter, outpatient FNA done for multinodular goiter back in July 2024, was benign. TSH 0.32 and patient was continued on home dose Levothyroxine 125mcg daily. For patient's hx of Diabetes, home ozempic was held and patient was managed with insulin sliding scale.        Patient is a 58 yo F, from home, ambulates independently, PMH of HTN, HLD, DM, hypothyroidism, dementia, cardiac arrest s/p ICD, current smoker who presented to the ED with aphasia, slurred speech, headache. Vitals, CBC, CMP unremarkable. Code stroke in ED. NIHSS 2, for aphasia. CTH: no bleed, no large vessel occlusion, but<50% stenosis of R carotid  bulb and 50% stenosis of proximal R ICA. Tele stroke consulted, s/p TNK. Admitted to ICU for post TNK monitoring. Pt passed dysphagia screen and started on PO diet, increased home atorvastatin to 80mg qhs. CTH at 24H post TNK showed no bleed. Pt started on ASA, plavix, DVT ppx with lovenox, and home meds memantine+donepezil for dementia. PT consulted and no recommended against PT. Neuro Dr. Fregoso was consulted and recommendations were followed. Pt medically stable for downgrade from ICU and was monitored on telemetry with q4hour Neuro and V/S checks. Lipid panel was significant for elevated TG and normal LDL. Patient was continued on ASA 81mg daily, clopidogrel 75mg daily, and decreased atorvastatin to 40mg qhs.     Patient's aphasia improved with NIHSS 0 on 12/30. MR brain showed no acute infarct, acute intracranial hemorrhage, or mass effect. TTE with bubble study showed LVEF 57%, negative for shunt. Patient also with hx of cardiac arrest.  For patient hx hypothyroidism w/ multinodal goiter, outpatient FNA done for multinodular goiter back in July 2024, was benign. TSH 0.32 and patient was continued on home dose Levothyroxine 125mcg daily. For patient's hx of Diabetes, home ozempic was held and patient was managed with insulin sliding scale. For patient's hx of dementia, she was continued on home medications donepezil and memantine.     Given patient's improved clinical status and current hemodynamic stability, decision was made to discharge the patient. Patient is stable for discharge per attending and is advised to follow up with PCP and Dr. Fregoso Neurology as outpatient.

## 2024-12-30 NOTE — DISCHARGE NOTE PROVIDER - NSDCCPCAREPLAN_GEN_ALL_CORE_FT
PRINCIPAL DISCHARGE DIAGNOSIS  Diagnosis: CVA (cerebrovascular accident)  Assessment and Plan of Treatment:       SECONDARY DISCHARGE DIAGNOSES  Diagnosis: Dementia  Assessment and Plan of Treatment: You have a history of dementia which means that you are experiencing deficits with attenton, memory and thinking that interfere with your daily life. You were continued on your home medication MEMANTINE * DONEZEPIL. Please continue to take your HOME medication and  follow up with your PCP in a week from discharge.      Diagnosis: HLD (hyperlipidemia)  Assessment and Plan of Treatment: You have history of Hyperlipidemia.   Please take your medication as prescribed. Maintain healthy lifestyle, low fat diet, exercise regularly and check your lipid levels routinely.   Please follow up with your PCP in 1 week from discharge.      Diagnosis: HTN (hypertension)  Assessment and Plan of Treatment: You have a history of Hypertension. XXXX You have been diagnosed with Hypertension.   On this admission, your Blood Pressure was adequately controlled with XXXXX  Check your blood pressure regularly at home, your blood pressure target is 120-140/80-90. If your BP is elevated over 180/110, please seek urgent medical attention. To care for your blood pressure at home maintain a healthy lifestyle, eat a low salt diet and added sugars, avoid fatty food, try to lose weight, and exercise regularly or stay active as tolerated 30 mins X 3 times per week.  Notify your doctor if you have any of the following symptoms:   (dizziness, lightheadedness, blurry vision, headache, chest pain, shortness of breath.)   Please continue taking your home medications as prescribed and follow-up with your PCP in 1 week from discharge to adjust medications as needed.      Diagnosis: DM (diabetes mellitus)  Assessment and Plan of Treatment: You have history of diabetes. Your HbA1c was 6 during this admission. You need to continue monitoring your blood sugar levels closely. Please continue to take OZEMPIC as prescribed by your doctor. Eat a diet that is low in added starches and sugars. Diabetes is associated with increased risk of many health conditions, including heart attacks, stroke, infections, kidney failure, and blindness. If you are physically able to, exercise at least 3 times a week. Please follow up with your primary care doctor/Endocrinologist within a week of discharge.      Diagnosis: Hypothyroidism  Assessment and Plan of Treatment: You have history of Hypothyroidism which means you do not make enough thyroid hormone. On this admission your TSH was 0.32. Signs & symptoms of low levels thyroid hormone production are- tiredness, getting cold easily, coarse or thin hair, constipation, shortness of breath, swelling, irregular periods.   Please continue to take your home medications and remember it needs to be taken first thing in the morning, on an empty stomach, not to take with any other medications and wait at least 30 minutes to eat.  You need to repeat thyroid function test in 4-6 weeks as outpatient and follow up with your PCP.       PRINCIPAL DISCHARGE DIAGNOSIS  Diagnosis: Transient ischemic attack  Assessment and Plan of Treatment: You presented to the ED with aphasia, which is the loss of ability to understand or express speech. You were diagnosed with transient ischemic attack (TIA) which means your brain had a temporary loss of oxygen perfusion but resolved on its own without any permanent brain damage. TIAs may present with transient neurologic symptoms such as facial droop, extremity weakness and difficulty speaking.  TIAs are often precursors to strokes and could serve as warning to prevent future stroke occurence.  CT head was negative for acute pathology on admission. You were treated with tenectaplase to prevent any clots and were monitored in the ICU. Your repeat CT head and MRI brain were unremarkable for any acute events. You were seen by Neurology who recommends ASPIRIN 81 MG DAILY FOR THE REST OF YOUR LIFE, ATORVASTATIN 40 MG DAILY AND PLAVIX 75 MG DAILY FOR A TOTAL OF 21, LAST DOSE 1/18/25. Please take medications as prescribed and follow up with your PCP/ Neurologist Dr. Fregoso in a week from discharge.        SECONDARY DISCHARGE DIAGNOSES  Diagnosis: Dementia  Assessment and Plan of Treatment: You have a history of dementia which means that you are experiencing deficits with attenton, memory and thinking that interfere with your daily life. You were continued on your home medication MEMANTINE * DONEZEPIL. Please continue to take your HOME medication and  follow up with your PCP in a week from discharge.      Diagnosis: HLD (hyperlipidemia)  Assessment and Plan of Treatment: You have history of Hyperlipidemia.   Please take your home medication at Atorvastatin 40mg daiily as prescribed. Maintain healthy lifestyle, low fat diet, exercise regularly and check your lipid levels routinely.   Please follow up with your PCP in 1 week from discharge.      Diagnosis: HTN (hypertension)  Assessment and Plan of Treatment: You have a history of Hypertension. XXXX You have been diagnosed with Hypertension.   On this admission, your Blood Pressure was adequately controlled with XXXXX  Check your blood pressure regularly at home, your blood pressure target is 120-140/80-90. If your BP is elevated over 180/110, please seek urgent medical attention. To care for your blood pressure at home maintain a healthy lifestyle, eat a low salt diet and added sugars, avoid fatty food, try to lose weight, and exercise regularly or stay active as tolerated 30 mins X 3 times per week.  Notify your doctor if you have any of the following symptoms:   (dizziness, lightheadedness, blurry vision, headache, chest pain, shortness of breath.)   Please continue taking your home medications as prescribed and follow-up with your PCP in 1 week from discharge to adjust medications as needed.      Diagnosis: DM (diabetes mellitus)  Assessment and Plan of Treatment: You have history of diabetes. Your HbA1c was 6 during this admission. You need to continue monitoring your blood sugar levels closely. Please continue to take OZEMPIC as prescribed by your doctor. Eat a diet that is low in added starches and sugars. Diabetes is associated with increased risk of many health conditions, including heart attacks, stroke, infections, kidney failure, and blindness. If you are physically able to, exercise at least 3 times a week. Please follow up with your primary care doctor/Endocrinologist within a week of discharge.      Diagnosis: Hypothyroidism  Assessment and Plan of Treatment: You have history of Hypothyroidism which means you do not make enough thyroid hormone. On this admission your TSH was 0.32. Signs & symptoms of low levels thyroid hormone production are- tiredness, getting cold easily, coarse or thin hair, constipation, shortness of breath, swelling, irregular periods.   Please continue to take your home medications and remember it needs to be taken first thing in the morning, on an empty stomach, not to take with any other medications and wait at least 30 minutes to eat.  You need to repeat thyroid function test in 4-6 weeks as outpatient and follow up with your PCP.       PRINCIPAL DISCHARGE DIAGNOSIS  Diagnosis: Transient ischemic attack  Assessment and Plan of Treatment: You presented to the ED with aphasia, which is the loss of ability to understand or express speech. You were diagnosed with transient ischemic attack (TIA) which means your brain had a temporary loss of oxygen perfusion but resolved on its own without any permanent brain damage. TIAs may present with transient neurologic symptoms such as facial droop, extremity weakness and difficulty speaking.  TIAs are often precursors to strokes and could serve as warning to prevent future stroke occurence.  CT head was negative for acute pathology on admission. You were treated with tenectaplase to prevent any clots and were monitored in the ICU. Your repeat CT head and MRI brain were unremarkable for any acute events. You were seen by Neurology who recommends ASPIRIN 81 MG DAILY FOR THE REST OF YOUR LIFE, ATORVASTATIN 40 MG DAILY AND PLAVIX 75 MG DAILY FOR A TOTAL OF 21, LAST DOSE 1/18/25. Please take medications as prescribed and follow up with your PCP/ Neurologist Dr. Fregoso in a week from discharge.        SECONDARY DISCHARGE DIAGNOSES  Diagnosis: Dementia  Assessment and Plan of Treatment: You have a history of dementia which means that you are experiencing deficits with attenton, memory and thinking that interfere with your daily life. You were continued on your home medication MEMANTINE * DONEZEPIL. Please continue to take your HOME medication and  follow up with your PCP in a week from discharge.      Diagnosis: HLD (hyperlipidemia)  Assessment and Plan of Treatment: You have history of Hyperlipidemia.   Please take your home medication at Atorvastatin 40mg daiily as prescribed. Maintain healthy lifestyle, low fat diet, exercise regularly and check your lipid levels routinely.   Please follow up with your PCP in 1 week from discharge.      Diagnosis: HTN (hypertension)  Assessment and Plan of Treatment: You have a history of Hypertension.   On this admission, your Blood Pressure medications were held to allow for permissive HTN in the setting of possible stroke.  Check your blood pressure regularly at home, your blood pressure target is 120-140/80-90. If your BP is elevated over 180/110, please seek urgent medical attention. To care for your blood pressure at home maintain a healthy lifestyle, eat a low salt diet and added sugars, avoid fatty food, try to lose weight, and exercise regularly or stay active as tolerated 30 mins X 3 times per week.  Notify your doctor if you have any of the following symptoms:   (dizziness, lightheadedness, blurry vision, headache, chest pain, shortness of breath.)   Please continue taking your home medications as prescribed and follow-up with your PCP in 1 week from discharge to adjust medications as needed.      Diagnosis: DM (diabetes mellitus)  Assessment and Plan of Treatment: You have history of diabetes. Your HbA1c was 6 during this admission. You need to continue monitoring your blood sugar levels closely. Please continue to take OZEMPIC as prescribed by your doctor. Eat a diet that is low in added starches and sugars. Diabetes is associated with increased risk of many health conditions, including heart attacks, stroke, infections, kidney failure, and blindness. If you are physically able to, exercise at least 3 times a week. Please follow up with your primary care doctor/Endocrinologist within a week of discharge.      Diagnosis: Hypothyroidism  Assessment and Plan of Treatment: You have history of Hypothyroidism which means you do not make enough thyroid hormone. On this admission your TSH was 0.32. Signs & symptoms of low levels thyroid hormone production are- tiredness, getting cold easily, coarse or thin hair, constipation, shortness of breath, swelling, irregular periods.   Please continue to take your home medications and remember it needs to be taken first thing in the morning, on an empty stomach, not to take with any other medications and wait at least 30 minutes to eat.  You need to repeat thyroid function test in 4-6 weeks as outpatient and follow up with your PCP.       PRINCIPAL DISCHARGE DIAGNOSIS  Diagnosis: Transient ischemic attack  Assessment and Plan of Treatment: You presented to the ED with aphasia, which is the loss of ability to understand or express speech. You were diagnosed with transient ischemic attack (TIA) which means your brain had a temporary loss of oxygen perfusion but resolved on its own without any permanent brain damage. TIAs may present with transient neurologic symptoms such as facial droop, extremity weakness and difficulty speaking.  TIAs are often precursors to strokes and could serve as warning to prevent future stroke occurence.  CT head was negative for acute pathology on admission. You were treated with tenectaplase to prevent any clots and were monitored in the ICU. Your repeat CT head and MRI brain were unremarkable for any acute events. Echo of the heart was done with no intracardiac shunt; left ventricular systolic function is normal with an ejection fraction of 57 %. You were seen by Neurology who recommends ASPIRIN 81 MG DAILY FOR THE REST OF YOUR LIFE, ATORVASTATIN 40 MG DAILY ongoing, AND PLAVIX 75 MG DAILY FOR A TOTAL OF 21 days with LAST DOSE 1/18/25. Please take medications as prescribed and follow up with your PCP/ Neurologist Dr. Fregoso in a week from discharge.        SECONDARY DISCHARGE DIAGNOSES  Diagnosis: Dementia  Assessment and Plan of Treatment: You have a history of dementia which means that you are experiencing deficits with attenton, memory and thinking that interfere with your daily life. You were continued on your home medication MEMANTINE  and DONEZEPIL. Please continue to take your HOME medication and  follow up with your PCP in a week from discharge.      Diagnosis: HTN (hypertension)  Assessment and Plan of Treatment: You have a history of Hypertension. Continue your home medications.  On this admission, your Blood Pressure medications were held to allow for permissive HTN in the setting of possible stroke.  Check your blood pressure regularly at home, your blood pressure target is 120-140/80-90. If your BP is elevated over 180/110, please seek urgent medical attention. To care for your blood pressure at home maintain a healthy lifestyle, eat a low salt diet and added sugars, avoid fatty food, try to lose weight, and exercise regularly or stay active as tolerated 30 mins X 3 times per week.  Notify your doctor if you have any of the following symptoms:   (dizziness, lightheadedness, blurry vision, headache, chest pain, shortness of breath.)   Please continue taking your home medications as prescribed and follow-up with your PCP in 1 week from discharge to adjust medications as needed.      Diagnosis: HLD (hyperlipidemia)  Assessment and Plan of Treatment: You have history of Hyperlipidemia.   Please take your home medication at Atorvastatin 40mg daily as prescribed. Maintain healthy lifestyle, low fat diet, exercise regularly and check your lipid levels routinely.   Please follow up with your PCP in 1 week from discharge.      Diagnosis: DM (diabetes mellitus)  Assessment and Plan of Treatment: You have history of diabetes. Your HbA1c was 6 during this admission. You need to continue monitoring your blood sugar levels closely. Please continue to take OZEMPIC as prescribed by your doctor. Eat a diet that is low in added starches and sugars. Diabetes is associated with increased risk of many health conditions, including heart attacks, stroke, infections, kidney failure, and blindness. If you are physically able to, exercise at least 3 times a week. Please follow up with your primary care doctor/Endocrinologist within a week of discharge.      Diagnosis: Hypothyroidism  Assessment and Plan of Treatment: You have history of Hypothyroidism which means you do not make enough thyroid hormone. On this admission your TSH was 0.32. Signs & symptoms of low levels thyroid hormone production are- tiredness, getting cold easily, coarse or thin hair, constipation, shortness of breath, swelling, irregular periods.   Please continue to take your home medications and remember it needs to be taken first thing in the morning, on an empty stomach, not to take with any other medications and wait at least 30 minutes to eat.  You need to repeat thyroid function test in 4-6 weeks as outpatient and follow up with your PCP.

## 2024-12-30 NOTE — CHART NOTE - NSCHARTNOTEFT_GEN_A_CORE
Spoke directly to Dr Villsaint at number listed in chart (please note this is her cell number), she was informed of the clinical course and medications patient will be sent home with. She also notes she is working with the patient to give her new neuro follow up as well.

## 2024-12-30 NOTE — DISCHARGE NOTE PROVIDER - CARE PROVIDER_API CALL
Villsaint, Danielle  Phone: [phone]  Fax: (   )    -  Established Patient  Follow Up Time: 1 week    Brian Fregoso  Neurology  [address] [place] [address] [address]  [place] [address]  Phone: [phone]  Fax: [phone]  Follow Up Time: 1 week

## 2024-12-30 NOTE — PROGRESS NOTE ADULT - SUBJECTIVE AND OBJECTIVE BOX
MS-III Progress Note discussed with attending and residents      PLEASE CONTACT ON CALL TEAM:  - On Call Team (Please refer to Ingrid) FROM 5:00 PM - 8:30PM  - Nightfloat Team FROM 8:30 -7:30 AM    INTERVAL HPI/OVERNIGHT EVENTS:  No acute overnight events. Pt evaluated at [facility]. Pt has no new complaints.    _________________________________________________  REVIEW OF SYSTEMS:  CONSTITUTIONAL: No acute distress  RESPIRATORY: No shortness of breath, wheezing, or cough  CARDIOVASCULAR: No chest pain or palpitations  GASTROINTESTINAL: No abdominal pain, nausea, vomiting, diarrhea, or constipation  GENITOURINARY: No dysuria or hermaturia  NEUROLOGICAL: No numbness, tremors, or loss of strength  SKIN: No new lesions    MEDICATIONS  (STANDING):  aspirin  chewable 81 milliGRAM(s) Oral daily  atorvastatin 80 milliGRAM(s) Oral at bedtime  chlorhexidine 2% Cloths 1 Application(s) Topical <User Schedule>  clopidogrel Tablet 75 milliGRAM(s) Oral daily  donepezil 10 milliGRAM(s) Oral at bedtime  enoxaparin Injectable 40 milliGRAM(s) SubCutaneous every 24 hours  influenza   Vaccine 0.5 milliLiter(s) IntraMuscular once  insulin lispro (ADMELOG) corrective regimen sliding scale   SubCutaneous three times a day before meals  levothyroxine 125 MICROGram(s) Oral daily  memantine 5 milliGRAM(s) Oral two times a day    MEDICATIONS  (PRN):      Vital Signs Last 24 Hrs  T(C): 36.4 (30 Dec 2024 07:11), Max: 37.1 (29 Dec 2024 20:30)  T(F): 97.5 (30 Dec 2024 07:11), Max: 98.8 (29 Dec 2024 20:30)  HR: 62 (30 Dec 2024 07:11) (62 - 70)  BP: 120/69 (30 Dec 2024 07:11) (111/67 - 121/74)  BP(mean): --  RR: 18 (30 Dec 2024 07:11) (18 - 18)  SpO2: 96% (30 Dec 2024 07:11) (94% - 99%)    Parameters below as of 30 Dec 2024 07:11  Patient On (Oxygen Delivery Method): room air      _________________________________________________  PHYSICAL EXAMINATION:  GENERAL: NAD  HEAD:  Atraumatic, Normocephalic  EYES:  conjunctiva and sclera clear  NECK: Supple, No JVD, Normal thyroid  CHEST/LUNG: Clear to auscultation.  HEART: Regular rate and rhythm; No murmurs, rubs, or gallops  ABDOMEN: Soft, Nontender, Bowel sounds present, no masses on palpation  NERVOUS SYSTEM:  Alert and oriented, face symmetric, no facial droop or slurred speech, 5/5 strength in upper and lower extremities b/l . sensation intact in upper and lower extremities b/l  EXTREMITIES:  No BLE edema  SKIN: warm, dry    I&O's Summary    _________________________________________________  LABS:                        13.2   6.86  )-----------( 258      ( 30 Dec 2024 05:45 )             41.6     12-30    142  |  111[H]  |  13  ----------------------------<  118[H]  3.6   |  24  |  0.80    Ca    8.6      30 Dec 2024 05:45  Phos  3.5     12-30  Mg     2.2     12-30    TPro  6.4  /  Alb  3.0[L]  /  TBili  0.4  /  DBili  x   /  AST  26  /  ALT  37  /  AlkPhos  105  12-30    LIVER FUNCTIONS - ( 30 Dec 2024 05:45 )  Alb: 3.0 g/dL / Pro: 6.4 g/dL / ALK PHOS: 105 U/L / ALT: 37 U/L DA / AST: 26 U/L / GGT: x           CAPILLARY BLOOD GLUCOSE      POCT Blood Glucose.: 116 mg/dL (30 Dec 2024 08:00)  POCT Blood Glucose.: 123 mg/dL (29 Dec 2024 21:08)  POCT Blood Glucose.: 127 mg/dL (29 Dec 2024 16:44)  POCT Blood Glucose.: 84 mg/dL (29 Dec 2024 11:28)              Urinalysis Basic - ( 30 Dec 2024 05:45 )    Color: x / Appearance: x / SG: x / pH: x  Gluc: 118 mg/dL / Ketone: x  / Bili: x / Urobili: x   Blood: x / Protein: x / Nitrite: x   Leuk Esterase: x / RBC: x / WBC x   Sq Epi: x / Non Sq Epi: x / Bacteria: x      _________________________________________________  RADIOLOGY & ADDITIONAL TESTS:    Imaging Personally Reviewed:  YES    Consultant(s) Notes Reviewed:   YES    Care Discussed with Consultants : YES     Plan of care was discussed with patient and /or primary care giver; all questions and concerns were addressed and care was aligned with patient's wishes.       MS-III Progress Note discussed with attending and residents      PLEASE CONTACT ON CALL TEAM:  - On Call Team (Please refer to Ingrid) FROM 5:00 PM - 8:30PM  - Nightfloat Team FROM 8:30 -7:30 AM    INTERVAL HPI/OVERNIGHT EVENTS:  No acute overnight events. Pt evaluated at bedside. Pt has no new complaints.    _________________________________________________  REVIEW OF SYSTEMS:  CONSTITUTIONAL: No acute distress  RESPIRATORY: No shortness of breath, wheezing, or cough  CARDIOVASCULAR: No chest pain or palpitations  GASTROINTESTINAL: No abdominal pain, nausea, vomiting, diarrhea, or constipation  GENITOURINARY: No dysuria or hematuria  NEUROLOGICAL: No numbness, tremors, or loss of strength  SKIN: No new lesions    MEDICATIONS  (STANDING):  aspirin  chewable 81 milliGRAM(s) Oral daily  atorvastatin 80 milliGRAM(s) Oral at bedtime  chlorhexidine 2% Cloths 1 Application(s) Topical <User Schedule>  clopidogrel Tablet 75 milliGRAM(s) Oral daily  donepezil 10 milliGRAM(s) Oral at bedtime  enoxaparin Injectable 40 milliGRAM(s) SubCutaneous every 24 hours  influenza   Vaccine 0.5 milliLiter(s) IntraMuscular once  insulin lispro (ADMELOG) corrective regimen sliding scale   SubCutaneous three times a day before meals  levothyroxine 125 MICROGram(s) Oral daily  memantine 5 milliGRAM(s) Oral two times a day    MEDICATIONS  (PRN):      Vital Signs Last 24 Hrs  T(C): 36.4 (30 Dec 2024 07:11), Max: 37.1 (29 Dec 2024 20:30)  T(F): 97.5 (30 Dec 2024 07:11), Max: 98.8 (29 Dec 2024 20:30)  HR: 62 (30 Dec 2024 07:11) (62 - 70)  BP: 120/69 (30 Dec 2024 07:11) (111/67 - 121/74)  BP(mean): --  RR: 18 (30 Dec 2024 07:11) (18 - 18)  SpO2: 96% (30 Dec 2024 07:11) (94% - 99%)    Parameters below as of 30 Dec 2024 07:11  Patient On (Oxygen Delivery Method): room air      _________________________________________________  PHYSICAL EXAMINATION:  GENERAL: NAD  HEAD:  Atraumatic, Normocephalic  EYES:  conjunctiva and sclera clear  NECK: Supple, No JVD, Normal thyroid  CHEST/LUNG: Clear to auscultation.  HEART: Regular rate and rhythm; No murmurs, rubs, or gallops  ABDOMEN: Soft, Nontender, Bowel sounds present, no masses on palpation  NERVOUS SYSTEM:  Alert and oriented, face symmetric, no facial droop or slurred speech, 5/5 strength in upper and lower extremities b/l . sensation intact in upper and lower extremities b/l  EXTREMITIES:  No BLE edema  SKIN: warm, dry    I&O's Summary    _________________________________________________  LABS:                        13.2   6.86  )-----------( 258      ( 30 Dec 2024 05:45 )             41.6     12-30    142  |  111[H]  |  13  ----------------------------<  118[H]  3.6   |  24  |  0.80    Ca    8.6      30 Dec 2024 05:45  Phos  3.5     12-30  Mg     2.2     12-30    TPro  6.4  /  Alb  3.0[L]  /  TBili  0.4  /  DBili  x   /  AST  26  /  ALT  37  /  AlkPhos  105  12-30    LIVER FUNCTIONS - ( 30 Dec 2024 05:45 )  Alb: 3.0 g/dL / Pro: 6.4 g/dL / ALK PHOS: 105 U/L / ALT: 37 U/L DA / AST: 26 U/L / GGT: x           CAPILLARY BLOOD GLUCOSE      POCT Blood Glucose.: 116 mg/dL (30 Dec 2024 08:00)  POCT Blood Glucose.: 123 mg/dL (29 Dec 2024 21:08)  POCT Blood Glucose.: 127 mg/dL (29 Dec 2024 16:44)  POCT Blood Glucose.: 84 mg/dL (29 Dec 2024 11:28)              Urinalysis Basic - ( 30 Dec 2024 05:45 )    Color: x / Appearance: x / SG: x / pH: x  Gluc: 118 mg/dL / Ketone: x  / Bili: x / Urobili: x   Blood: x / Protein: x / Nitrite: x   Leuk Esterase: x / RBC: x / WBC x   Sq Epi: x / Non Sq Epi: x / Bacteria: x      _________________________________________________  RADIOLOGY & ADDITIONAL TESTS:    Imaging Personally Reviewed:  YES    Consultant(s) Notes Reviewed:   YES    Care Discussed with Consultants : YES     Plan of care was discussed with patient and /or primary care giver; all questions and concerns were addressed and care was aligned with patient's wishes.

## 2024-12-30 NOTE — DISCHARGE NOTE PROVIDER - PROVIDER TOKENS
FREE:[LAST:[Villsaint],FIRST:[Darcy],PHONE:[phone]],FAX:[(   )    -],FOLLOWUP:[1 week],ESTABLISHEDPATIENT:[T]],PROVIDER:[TOKEN:[[id number]],FOLLOWUP:[1 week]]

## 2024-12-30 NOTE — DISCHARGE NOTE NURSING/CASE MANAGEMENT/SOCIAL WORK - FINANCIAL ASSISTANCE
[facility] provides services at a reduced cost to those who are determined to be eligible through [facility]’s financial assistance program. Information regarding [facility]’s financial assistance program can be found by going to [email or web address] or by calling [phone].

## 2025-02-14 ENCOUNTER — APPOINTMENT (OUTPATIENT)
Dept: NEUROLOGY | Facility: CLINIC | Age: 60
End: 2025-02-14
Payer: MEDICAID

## 2025-02-14 VITALS — DIASTOLIC BLOOD PRESSURE: 64 MMHG | HEART RATE: 47 BPM | SYSTOLIC BLOOD PRESSURE: 100 MMHG

## 2025-02-14 VITALS — HEIGHT: 65 IN | BODY MASS INDEX: 37.65 KG/M2 | WEIGHT: 226 LBS

## 2025-02-14 PROCEDURE — 93888 INTRACRANIAL LIMITED STUDY: CPT

## 2025-02-14 PROCEDURE — 93880 EXTRACRANIAL BILAT STUDY: CPT

## 2025-02-14 PROCEDURE — 99205 OFFICE O/P NEW HI 60 MIN: CPT | Mod: 25

## 2025-02-17 ENCOUNTER — TRANSCRIPTION ENCOUNTER (OUTPATIENT)
Age: 60
End: 2025-02-17

## 2025-02-20 RX ORDER — DONEPEZIL HYDROCHLORIDE 10 MG/1
10 TABLET ORAL
Refills: 0 | Status: ACTIVE | COMMUNITY
Start: 2025-02-20

## 2025-02-20 RX ORDER — ATORVASTATIN CALCIUM 40 MG/1
40 TABLET, FILM COATED ORAL
Refills: 0 | Status: ACTIVE | COMMUNITY
Start: 2025-02-20

## 2025-02-20 RX ORDER — SEMAGLUTIDE 1.34 MG/ML
4 INJECTION, SOLUTION SUBCUTANEOUS
Refills: 0 | Status: ACTIVE | COMMUNITY
Start: 2025-02-20

## 2025-02-20 RX ORDER — MEMANTINE HYDROCHLORIDE 10 MG/1
10 TABLET, FILM COATED ORAL
Refills: 0 | Status: ACTIVE | COMMUNITY
Start: 2025-02-20

## 2025-05-19 ENCOUNTER — APPOINTMENT (OUTPATIENT)
Dept: NEUROLOGY | Facility: CLINIC | Age: 60
End: 2025-05-19